# Patient Record
Sex: MALE | URBAN - METROPOLITAN AREA
[De-identification: names, ages, dates, MRNs, and addresses within clinical notes are randomized per-mention and may not be internally consistent; named-entity substitution may affect disease eponyms.]

---

## 2020-08-13 ENCOUNTER — APPOINTMENT (OUTPATIENT)
Age: 45
Setting detail: DERMATOLOGY
End: 2020-08-16

## 2020-08-13 DIAGNOSIS — L29.8 OTHER PRURITUS: ICD-10-CM

## 2020-08-13 DIAGNOSIS — L28.0 LICHEN SIMPLEX CHRONICUS: ICD-10-CM

## 2020-08-13 PROBLEM — L30.9 DERMATITIS, UNSPECIFIED: Status: ACTIVE | Noted: 2020-08-13

## 2020-08-13 PROCEDURE — OTHER MIPS QUALITY: OTHER

## 2020-08-13 PROCEDURE — OTHER PRESCRIPTION: OTHER

## 2020-08-13 PROCEDURE — 99202 OFFICE O/P NEW SF 15 MIN: CPT | Mod: 25

## 2020-08-13 PROCEDURE — 11105 PUNCH BX SKIN EA SEP/ADDL: CPT

## 2020-08-13 PROCEDURE — OTHER BIOPSY BY PUNCH METHOD: OTHER

## 2020-08-13 PROCEDURE — OTHER COUNSELING: OTHER

## 2020-08-13 PROCEDURE — 11104 PUNCH BX SKIN SINGLE LESION: CPT

## 2020-08-13 RX ORDER — HYDROXYZINE HYDROCHLORIDE 25 MG/1
TABLET, FILM COATED ORAL QD
Qty: 60 | Refills: 1 | Status: ERX | COMMUNITY
Start: 2020-08-13

## 2020-08-13 RX ORDER — FEXOFENADINE 180 MG/1
TABLET, FILM COATED ORAL QD
Qty: 30 | Refills: 1 | Status: ERX | COMMUNITY
Start: 2020-08-13

## 2020-08-13 RX ORDER — CLOBETASOL PROPIONATE 0.5 MG/G
OINTMENT TOPICAL BID
Qty: 1 | Refills: 1 | Status: ERX | COMMUNITY
Start: 2020-08-13

## 2020-08-13 ASSESSMENT — LOCATION DETAILED DESCRIPTION DERM
LOCATION DETAILED: RIGHT MEDIAL DISTAL PRETIBIAL REGION
LOCATION DETAILED: RIGHT DISTAL PRETIBIAL REGION
LOCATION DETAILED: RIGHT ANKLE

## 2020-08-13 ASSESSMENT — LOCATION ZONE DERM: LOCATION ZONE: LEG

## 2020-08-13 ASSESSMENT — LOCATION SIMPLE DESCRIPTION DERM
LOCATION SIMPLE: RIGHT PRETIBIAL REGION
LOCATION SIMPLE: RIGHT ANKLE

## 2020-08-13 NOTE — PROCEDURE: BIOPSY BY PUNCH METHOD
Was A Bandage Applied: Yes
Notification Instructions: Patient will be notified of biopsy results. However, patient instructed to call the office if not contacted within 2 weeks.
Punch Size In Mm: 3
Bill For Surgical Tray: no
Information: Selecting Yes will display possible errors in your note based on the variables you have selected. This validation is only offered as a suggestion for you. PLEASE NOTE THAT THE VALIDATION TEXT WILL BE REMOVED WHEN YOU FINALIZE YOUR NOTE. IF YOU WANT TO FAX A PRELIMINARY NOTE YOU WILL NEED TO TOGGLE THIS TO 'NO' IF YOU DO NOT WANT IT IN YOUR FAXED NOTE.
Billing Type: Third-Party Bill
Post-Care Instructions: I reviewed with the patient in detail post-care instructions. Patient is to keep the biopsy site dry overnight, and then wash with hydrogen peroxide 50/50 mix or antibacterial soap and water twice daily and apply bacitracin twice daily healed. Patient may apply hydrogen peroxide soaks to remove any crusting.
Biopsy Type: H and E
Anesthesia Type: 1% lidocaine without epinephrine
Wound Care: Bacitracin
Home Suture Removal Text: Patient was provided a home suture removal kit and will remove their sutures at home.  If they have any questions or difficulties they will call the office.
Suture Removal: 14 days
Epidermal Sutures: 4-0 Nylon
Detail Level: Detailed
Consent: Written consent was obtained and risks were reviewed including but not limited to scarring, infection, bleeding, scabbing, incomplete removal, nerve damage and allergy to anesthesia.
X Depth Of Punch In Cm (Optional): 0
Dressing: no dressing applied
Hemostasis: None
Anesthesia Volume In Cc (Will Not Render If 0): 0.5

## 2023-02-24 ENCOUNTER — TRANSCRIBE ORDERS (OUTPATIENT)
Dept: ADMINISTRATIVE | Facility: HOSPITAL | Age: 48
End: 2023-02-24
Payer: MEDICARE

## 2023-02-24 DIAGNOSIS — M54.50 LOW BACK PAIN, UNSPECIFIED BACK PAIN LATERALITY, UNSPECIFIED CHRONICITY, UNSPECIFIED WHETHER SCIATICA PRESENT: ICD-10-CM

## 2023-02-24 DIAGNOSIS — M54.31 RIGHT SIDED SCIATICA: Primary | ICD-10-CM

## 2023-03-18 ENCOUNTER — HOSPITAL ENCOUNTER (OUTPATIENT)
Dept: CT IMAGING | Facility: HOSPITAL | Age: 48
Discharge: HOME | End: 2023-03-18
Admitting: NURSE PRACTITIONER
Payer: MEDICARE

## 2023-03-18 DIAGNOSIS — M54.31 RIGHT SIDED SCIATICA: ICD-10-CM

## 2023-03-18 DIAGNOSIS — M54.50 LOW BACK PAIN, UNSPECIFIED BACK PAIN LATERALITY, UNSPECIFIED CHRONICITY, UNSPECIFIED WHETHER SCIATICA PRESENT: ICD-10-CM

## 2023-03-18 PROCEDURE — 25510000001 IOPAMIDOL 61 % SOLUTION: Performed by: NURSE PRACTITIONER

## 2023-03-18 PROCEDURE — 72133 CT LUMBAR SPINE W/O & W/DYE: CPT

## 2023-03-18 PROCEDURE — 72133 CT LUMBAR SPINE W/O & W/DYE: CPT | Performed by: RADIOLOGY

## 2023-03-18 RX ADMIN — IOPAMIDOL 84 ML: 612 INJECTION, SOLUTION INTRAVENOUS at 09:08

## 2023-12-26 ENCOUNTER — TRANSCRIBE ORDERS (OUTPATIENT)
Dept: ADMINISTRATIVE | Facility: HOSPITAL | Age: 48
End: 2023-12-26
Payer: MEDICARE

## 2023-12-26 DIAGNOSIS — R42 DIZZINESS AND GIDDINESS: Primary | ICD-10-CM

## 2024-01-18 ENCOUNTER — HOSPITAL ENCOUNTER (OUTPATIENT)
Dept: MRI IMAGING | Facility: HOSPITAL | Age: 49
Discharge: HOME | End: 2024-01-18
Payer: MEDICARE

## 2024-01-18 ENCOUNTER — HOSPITAL ENCOUNTER (OUTPATIENT)
Dept: CARDIOLOGY | Facility: HOSPITAL | Age: 49
Discharge: HOME | End: 2024-01-18
Payer: MEDICARE

## 2024-01-18 DIAGNOSIS — R42 DIZZINESS AND GIDDINESS: ICD-10-CM

## 2024-01-18 PROCEDURE — 93880 EXTRACRANIAL BILAT STUDY: CPT

## 2024-01-18 PROCEDURE — 70551 MRI BRAIN STEM W/O DYE: CPT | Performed by: RADIOLOGY

## 2024-01-18 PROCEDURE — 70551 MRI BRAIN STEM W/O DYE: CPT

## 2024-05-27 ENCOUNTER — HOSPITAL ENCOUNTER (EMERGENCY)
Facility: HOSPITAL | Age: 49
Discharge: HOME | End: 2024-05-27
Attending: STUDENT IN AN ORGANIZED HEALTH CARE EDUCATION/TRAINING PROGRAM | Admitting: STUDENT IN AN ORGANIZED HEALTH CARE EDUCATION/TRAINING PROGRAM
Payer: MEDICARE

## 2024-05-27 ENCOUNTER — APPOINTMENT (OUTPATIENT)
Dept: GENERAL RADIOLOGY | Facility: HOSPITAL | Age: 49
End: 2024-05-27
Payer: MEDICARE

## 2024-05-27 VITALS
OXYGEN SATURATION: 97 % | HEIGHT: 72 IN | BODY MASS INDEX: 29.12 KG/M2 | SYSTOLIC BLOOD PRESSURE: 138 MMHG | RESPIRATION RATE: 18 BRPM | WEIGHT: 215 LBS | DIASTOLIC BLOOD PRESSURE: 96 MMHG | HEART RATE: 62 BPM | TEMPERATURE: 98 F

## 2024-05-27 DIAGNOSIS — R07.9 CHEST PAIN, UNSPECIFIED TYPE: Primary | ICD-10-CM

## 2024-05-27 LAB
ALBUMIN SERPL-MCNC: 4.6 G/DL (ref 3.5–5.2)
ALBUMIN/GLOB SERPL: 1.6 G/DL
ALP SERPL-CCNC: 91 U/L (ref 39–117)
ALT SERPL W P-5'-P-CCNC: 15 U/L (ref 1–41)
ANION GAP SERPL CALCULATED.3IONS-SCNC: 10 MMOL/L (ref 5–15)
AST SERPL-CCNC: 20 U/L (ref 1–40)
BASOPHILS # BLD AUTO: 0.05 10*3/MM3 (ref 0–0.2)
BASOPHILS NFR BLD AUTO: 0.5 % (ref 0–1.5)
BILIRUB SERPL-MCNC: 0.5 MG/DL (ref 0–1.2)
BUN SERPL-MCNC: 6 MG/DL (ref 6–20)
BUN/CREAT SERPL: 5.5 (ref 7–25)
CALCIUM SPEC-SCNC: 9.3 MG/DL (ref 8.6–10.5)
CHLORIDE SERPL-SCNC: 105 MMOL/L (ref 98–107)
CO2 SERPL-SCNC: 24 MMOL/L (ref 22–29)
CREAT SERPL-MCNC: 1.1 MG/DL (ref 0.76–1.27)
DEPRECATED RDW RBC AUTO: 44.9 FL (ref 37–54)
EGFRCR SERPLBLD CKD-EPI 2021: 82.8 ML/MIN/1.73
EOSINOPHIL # BLD AUTO: 0.24 10*3/MM3 (ref 0–0.4)
EOSINOPHIL NFR BLD AUTO: 2.6 % (ref 0.3–6.2)
ERYTHROCYTE [DISTWIDTH] IN BLOOD BY AUTOMATED COUNT: 13.7 % (ref 12.3–15.4)
GEN 5 2HR TROPONIN T REFLEX: <6 NG/L
GLOBULIN UR ELPH-MCNC: 2.9 GM/DL
GLUCOSE SERPL-MCNC: 93 MG/DL (ref 65–99)
HCT VFR BLD AUTO: 46.2 % (ref 37.5–51)
HGB BLD-MCNC: 15.7 G/DL (ref 13–17.7)
HOLD SPECIMEN: NORMAL
HOLD SPECIMEN: NORMAL
IMM GRANULOCYTES # BLD AUTO: 0.03 10*3/MM3 (ref 0–0.05)
IMM GRANULOCYTES NFR BLD AUTO: 0.3 % (ref 0–0.5)
LYMPHOCYTES # BLD AUTO: 2.74 10*3/MM3 (ref 0.7–3.1)
LYMPHOCYTES NFR BLD AUTO: 29.8 % (ref 19.6–45.3)
MCH RBC QN AUTO: 30.6 PG (ref 26.6–33)
MCHC RBC AUTO-ENTMCNC: 34 G/DL (ref 31.5–35.7)
MCV RBC AUTO: 90.1 FL (ref 79–97)
MONOCYTES # BLD AUTO: 0.59 10*3/MM3 (ref 0.1–0.9)
MONOCYTES NFR BLD AUTO: 6.4 % (ref 5–12)
NEUTROPHILS NFR BLD AUTO: 5.55 10*3/MM3 (ref 1.7–7)
NEUTROPHILS NFR BLD AUTO: 60.4 % (ref 42.7–76)
NRBC BLD AUTO-RTO: 0 /100 WBC (ref 0–0.2)
PLATELET # BLD AUTO: 163 10*3/MM3 (ref 140–450)
PMV BLD AUTO: 10.4 FL (ref 6–12)
POTASSIUM SERPL-SCNC: 3.9 MMOL/L (ref 3.5–5.2)
PROT SERPL-MCNC: 7.5 G/DL (ref 6–8.5)
RBC # BLD AUTO: 5.13 10*6/MM3 (ref 4.14–5.8)
SODIUM SERPL-SCNC: 139 MMOL/L (ref 136–145)
TROPONIN T DELTA: NORMAL
TROPONIN T SERPL HS-MCNC: 6 NG/L
WBC NRBC COR # BLD AUTO: 9.2 10*3/MM3 (ref 3.4–10.8)
WHOLE BLOOD HOLD COAG: NORMAL
WHOLE BLOOD HOLD SPECIMEN: NORMAL

## 2024-05-27 PROCEDURE — 85025 COMPLETE CBC W/AUTO DIFF WBC: CPT | Performed by: STUDENT IN AN ORGANIZED HEALTH CARE EDUCATION/TRAINING PROGRAM

## 2024-05-27 PROCEDURE — 71045 X-RAY EXAM CHEST 1 VIEW: CPT

## 2024-05-27 PROCEDURE — 25010000002 KETOROLAC TROMETHAMINE PER 15 MG: Performed by: STUDENT IN AN ORGANIZED HEALTH CARE EDUCATION/TRAINING PROGRAM

## 2024-05-27 PROCEDURE — 96374 THER/PROPH/DIAG INJ IV PUSH: CPT

## 2024-05-27 PROCEDURE — 80053 COMPREHEN METABOLIC PANEL: CPT | Performed by: STUDENT IN AN ORGANIZED HEALTH CARE EDUCATION/TRAINING PROGRAM

## 2024-05-27 PROCEDURE — 93005 ELECTROCARDIOGRAM TRACING: CPT | Performed by: STUDENT IN AN ORGANIZED HEALTH CARE EDUCATION/TRAINING PROGRAM

## 2024-05-27 PROCEDURE — 84484 ASSAY OF TROPONIN QUANT: CPT | Performed by: STUDENT IN AN ORGANIZED HEALTH CARE EDUCATION/TRAINING PROGRAM

## 2024-05-27 PROCEDURE — 99284 EMERGENCY DEPT VISIT MOD MDM: CPT

## 2024-05-27 PROCEDURE — 36415 COLL VENOUS BLD VENIPUNCTURE: CPT

## 2024-05-27 PROCEDURE — 71045 X-RAY EXAM CHEST 1 VIEW: CPT | Performed by: RADIOLOGY

## 2024-05-27 RX ORDER — ASPIRIN 81 MG/1
324 TABLET, CHEWABLE ORAL ONCE
Status: COMPLETED | OUTPATIENT
Start: 2024-05-27 | End: 2024-05-27

## 2024-05-27 RX ORDER — SODIUM CHLORIDE 0.9 % (FLUSH) 0.9 %
10 SYRINGE (ML) INJECTION AS NEEDED
Status: DISCONTINUED | OUTPATIENT
Start: 2024-05-27 | End: 2024-05-28 | Stop reason: HOSPADM

## 2024-05-27 RX ORDER — CYCLOBENZAPRINE HCL 10 MG
10 TABLET ORAL ONCE
Status: COMPLETED | OUTPATIENT
Start: 2024-05-27 | End: 2024-05-27

## 2024-05-27 RX ORDER — KETOROLAC TROMETHAMINE 30 MG/ML
30 INJECTION, SOLUTION INTRAMUSCULAR; INTRAVENOUS ONCE
Status: COMPLETED | OUTPATIENT
Start: 2024-05-27 | End: 2024-05-27

## 2024-05-27 RX ADMIN — ASPIRIN 324 MG: 81 TABLET, CHEWABLE ORAL at 20:08

## 2024-05-27 RX ADMIN — KETOROLAC TROMETHAMINE 30 MG: 30 INJECTION, SOLUTION INTRAMUSCULAR; INTRAVENOUS at 23:18

## 2024-05-27 RX ADMIN — CYCLOBENZAPRINE 10 MG: 10 TABLET, FILM COATED ORAL at 23:18

## 2024-05-28 LAB
QT INTERVAL: 348 MS
QTC INTERVAL: 408 MS

## 2024-05-28 NOTE — ED PROVIDER NOTES
Subjective   History of Present Illness  49-year-old male with past medical history of anxiety and smoking presents to the ER with a primary complaint of right-sided chest pain this been present for the last 1 to 2 weeks.  Patient notes intermittent chest pain.  No obvious aggravating factors.  Patient notes the chest tightness with radiation into his right arm.  No shortness of breath.  No nausea or vomiting.  No diaphoresis.  No obvious alleviating factors.  Vital signs stable.  Afebrile      Review of Systems   Cardiovascular:  Positive for chest pain.   All other systems reviewed and are negative.      No past medical history on file.    No Known Allergies    No past surgical history on file.    No family history on file.    Social History     Socioeconomic History    Marital status:            Objective   Physical Exam  Constitutional:       General: He is not in acute distress.     Appearance: He is well-developed. He is not ill-appearing.   HENT:      Head: Normocephalic and atraumatic.   Eyes:      Extraocular Movements: Extraocular movements intact.      Pupils: Pupils are equal, round, and reactive to light.   Neck:      Vascular: No JVD.   Cardiovascular:      Rate and Rhythm: Normal rate and regular rhythm.      Heart sounds: Normal heart sounds. No murmur heard.  Pulmonary:      Effort: No tachypnea, accessory muscle usage or respiratory distress.      Breath sounds: Normal breath sounds. No stridor. No decreased breath sounds, wheezing, rhonchi or rales.   Chest:      Chest wall: No deformity, tenderness or crepitus.   Abdominal:      General: Bowel sounds are normal.      Palpations: Abdomen is soft.      Tenderness: There is no abdominal tenderness. There is no guarding or rebound.   Musculoskeletal:         General: Normal range of motion.      Cervical back: Normal range of motion and neck supple.      Right lower leg: No tenderness. No edema.      Left lower leg: No tenderness. No edema.    Lymphadenopathy:      Cervical: No cervical adenopathy.   Skin:     General: Skin is warm and dry.      Coloration: Skin is not cyanotic.      Findings: No ecchymosis or erythema.   Neurological:      General: No focal deficit present.      Mental Status: He is alert and oriented to person, place, and time.      Cranial Nerves: No cranial nerve deficit.      Motor: No weakness.   Psychiatric:         Mood and Affect: Mood normal. Mood is not anxious.         Behavior: Behavior normal. Behavior is not agitated.         Procedures           ED Course  ED Course as of 05/27/24 2326   Mon May 27, 2024   1957 EKG notes sinus rhythm.  83 bpm.  I directly evaluated the EKG of this patient and noted no acute abnormalities..  Electronically signed by Jason Conde DO, 05/27/24, 7:58 PM EDT.   [SF]      ED Course User Index  [SF] Jason Conde DO                HEART Score: 2                  Results for orders placed or performed during the hospital encounter of 05/27/24   High Sensitivity Troponin T    Specimen: Arm, Left; Blood   Result Value Ref Range    HS Troponin T 6 <22 ng/L   Comprehensive Metabolic Panel    Specimen: Arm, Left; Blood   Result Value Ref Range    Glucose 93 65 - 99 mg/dL    BUN 6 6 - 20 mg/dL    Creatinine 1.10 0.76 - 1.27 mg/dL    Sodium 139 136 - 145 mmol/L    Potassium 3.9 3.5 - 5.2 mmol/L    Chloride 105 98 - 107 mmol/L    CO2 24.0 22.0 - 29.0 mmol/L    Calcium 9.3 8.6 - 10.5 mg/dL    Total Protein 7.5 6.0 - 8.5 g/dL    Albumin 4.6 3.5 - 5.2 g/dL    ALT (SGPT) 15 1 - 41 U/L    AST (SGOT) 20 1 - 40 U/L    Alkaline Phosphatase 91 39 - 117 U/L    Total Bilirubin 0.5 0.0 - 1.2 mg/dL    Globulin 2.9 gm/dL    A/G Ratio 1.6 g/dL    BUN/Creatinine Ratio 5.5 (L) 7.0 - 25.0    Anion Gap 10.0 5.0 - 15.0 mmol/L    eGFR 82.8 >60.0 mL/min/1.73   CBC Auto Differential    Specimen: Arm, Left; Blood   Result Value Ref Range    WBC 9.20 3.40 - 10.80 10*3/mm3    RBC 5.13 4.14 - 5.80 10*6/mm3    Hemoglobin  15.7 13.0 - 17.7 g/dL    Hematocrit 46.2 37.5 - 51.0 %    MCV 90.1 79.0 - 97.0 fL    MCH 30.6 26.6 - 33.0 pg    MCHC 34.0 31.5 - 35.7 g/dL    RDW 13.7 12.3 - 15.4 %    RDW-SD 44.9 37.0 - 54.0 fl    MPV 10.4 6.0 - 12.0 fL    Platelets 163 140 - 450 10*3/mm3    Neutrophil % 60.4 42.7 - 76.0 %    Lymphocyte % 29.8 19.6 - 45.3 %    Monocyte % 6.4 5.0 - 12.0 %    Eosinophil % 2.6 0.3 - 6.2 %    Basophil % 0.5 0.0 - 1.5 %    Immature Grans % 0.3 0.0 - 0.5 %    Neutrophils, Absolute 5.55 1.70 - 7.00 10*3/mm3    Lymphocytes, Absolute 2.74 0.70 - 3.10 10*3/mm3    Monocytes, Absolute 0.59 0.10 - 0.90 10*3/mm3    Eosinophils, Absolute 0.24 0.00 - 0.40 10*3/mm3    Basophils, Absolute 0.05 0.00 - 0.20 10*3/mm3    Immature Grans, Absolute 0.03 0.00 - 0.05 10*3/mm3    nRBC 0.0 0.0 - 0.2 /100 WBC   High Sensitivity Troponin T 2Hr    Specimen: Arm, Left; Blood   Result Value Ref Range    HS Troponin T <6 <22 ng/L    Troponin T Delta     ECG 12 Lead Chest Pain   Result Value Ref Range    QT Interval 348 ms    QTC Interval 408 ms   Green Top (Gel)   Result Value Ref Range    Extra Tube Hold for add-ons.    Lavender Top   Result Value Ref Range    Extra Tube hold for add-on    Gold Top - SST   Result Value Ref Range    Extra Tube Hold for add-ons.    Light Blue Top   Result Value Ref Range    Extra Tube Hold for add-ons.          XR Chest 1 View    Result Date: 5/27/2024  No acute cardiopulmonary process.   This report was finalized on 5/27/2024 8:08 PM by Alex Pallas, DO.                 Medical Decision Making  CBC and CMP are unremarkable.  Troponin trended x 2 and within normal is.  EKG listed.  Chest x-ray unremarkable.  Patient will likely need further outpatient follow-up and workup.  Low clinical concerns for ACS at this point per high-sensitivity troponin protocol at [facility].  Work up and results were discussed throughly with the patient.  The patient will be discharged for further monitoring and management with  their PCP.  Red flags, warning signs, worsening symptoms, and when to return to the ER discussed with and understood by the patient.  Patient will follow up with their PCP in a timely manner.  Vitals stable at discharge.    Amount and/or Complexity of Data Reviewed  Labs: ordered. Decision-making details documented in ED Course.  Radiology: ordered. Decision-making details documented in ED Course.  ECG/medicine tests: ordered.    Risk  OTC drugs.  Prescription drug management.        Final diagnoses:   Chest pain, unspecified type       ED Disposition  ED Disposition       ED Disposition   Discharge    Condition   Stable    Comment   --               Daniela Delaney, APRN  [address] [address]  [phone]    In 1 week      [facility] EMERGENCY DEPARTMENT  [address] [place] [id number] [phone]    If symptoms worsen         Medication List      No changes were made to your prescriptions during this visit.            Jason Conde DO  05/27/24 [id number]

## 2024-06-11 ENCOUNTER — PATIENT ROUNDING (BHMG ONLY) (OUTPATIENT)
Dept: CARDIOLOGY | Facility: CLINIC | Age: 49
End: 2024-06-11
Payer: MEDICARE

## 2024-06-11 ENCOUNTER — OFFICE VISIT (OUTPATIENT)
Dept: CARDIOLOGY | Facility: CLINIC | Age: 49
End: 2024-06-11
Payer: MEDICARE

## 2024-06-11 VITALS
HEART RATE: 72 BPM | SYSTOLIC BLOOD PRESSURE: 147 MMHG | WEIGHT: 215.8 LBS | HEIGHT: 72 IN | RESPIRATION RATE: 18 BRPM | BODY MASS INDEX: 29.23 KG/M2 | OXYGEN SATURATION: 98 % | DIASTOLIC BLOOD PRESSURE: 89 MMHG

## 2024-06-11 DIAGNOSIS — R07.2 PRECORDIAL CHEST PAIN: Primary | ICD-10-CM

## 2024-06-11 DIAGNOSIS — E78.5 DYSLIPIDEMIA: ICD-10-CM

## 2024-06-11 DIAGNOSIS — F17.210 CIGARETTE SMOKER: ICD-10-CM

## 2024-06-11 DIAGNOSIS — I10 ESSENTIAL HYPERTENSION: ICD-10-CM

## 2024-06-11 PROCEDURE — 99204 OFFICE O/P NEW MOD 45 MIN: CPT | Performed by: SPECIALIST

## 2024-06-11 PROCEDURE — 3079F DIAST BP 80-89 MM HG: CPT | Performed by: SPECIALIST

## 2024-06-11 PROCEDURE — 3077F SYST BP >= 140 MM HG: CPT | Performed by: SPECIALIST

## 2024-06-11 RX ORDER — NITROGLYCERIN 0.4 MG/1
TABLET SUBLINGUAL
Qty: 25 TABLET | Refills: 3 | Status: SHIPPED | OUTPATIENT
Start: 2024-06-11

## 2024-06-11 RX ORDER — CLONAZEPAM 1 MG/1
TABLET ORAL
COMMUNITY

## 2024-06-11 RX ORDER — DULOXETIN HYDROCHLORIDE 60 MG/1
1 CAPSULE, DELAYED RELEASE ORAL DAILY
COMMUNITY
Start: 2024-05-30

## 2024-06-11 NOTE — PROGRESS NOTES
Subjective   Initial consultation, chest pain  Alejo Wheatley is a 49 y.o. male who presents to day for Establish Care.    CHIEF COMPLIANT  Chief Complaint   Patient presents with    Establish Care       Active Problems:  Problem List Items Addressed This Visit          Cardiac and Vasculature    Precordial chest pain - Primary    Relevant Orders    Stress Test With Myocardial Perfusion One Day    Adult Transthoracic Echo Complete w/ Color, Spectral and Contrast if necessary per protocol    Essential hypertension    Relevant Orders    Stress Test With Myocardial Perfusion One Day    Adult Transthoracic Echo Complete w/ Color, Spectral and Contrast if necessary per protocol    Dyslipidemia    Relevant Orders    Lipid Panel       Tobacco    Cigarette smoker       HPI  HPI  For the last couple of weeks the patient has been having almost daily chest pains they can last for hours they are retrosternal not radiating anywhere and nothing increases or decreases the pain not as she has any other symptoms and is not anxious to exertion or food have been happening now almost daily as mentioned above it can last anytime after 2 hours he was seen in the emergency room with negative troponin he said he is fairly active with no shortness of breath no edema no palpitations he smokes about a pack per day for almost 30 years and no history of hypertension or diabetes but has history of hyperlipidemia not taking medicine for that, family history his father has 60 he is but paternal grandmother also  of a heart attack in her 70s  PRIOR MEDS  Current Outpatient Medications on File Prior to Visit   Medication Sig Dispense Refill    DULoxetine (CYMBALTA) 60 MG capsule Take 1 capsule by mouth Daily.      KlonoPIN 1 MG tablet        No current facility-administered medications on file prior to visit.       ALLERGIES  Patient has no known allergies.    HISTORY  Past Medical History:   Diagnosis Date    Hyperlipidemia     Hypertension      "Panic attack        Social History     Socioeconomic History    Marital status:    Tobacco Use    Smoking status: Every Day     Current packs/day: 1.50     Average packs/day: 1.5 packs/day for 15.0 years (22.5 ttl pk-yrs)     Types: Cigarettes   Vaping Use    Vaping status: Never Used   Substance and Sexual Activity    Alcohol use: Never    Drug use: Never    Sexual activity: Yes     Partners: Female     Birth control/protection: None       History reviewed. No pertinent family history.    Review of Systems   Constitutional: Negative.    HENT: Negative.     Eyes: Negative.    Respiratory:  Positive for chest tightness (Not active). Negative for apnea, cough, choking, shortness of breath, wheezing and stridor.    Cardiovascular: Negative.  Negative for chest pain, palpitations and leg swelling.   Gastrointestinal: Negative.    Endocrine: Negative.    Genitourinary: Negative.    Musculoskeletal: Negative.    Skin: Negative.    Allergic/Immunologic: Negative.    Neurological: Negative.    Hematological: Negative.    Psychiatric/Behavioral: Negative.         Objective     VITALS: /89 (BP Location: Left arm, Patient Position: Sitting, Cuff Size: Adult)   Pulse 72   Resp 18   Ht 182.9 cm (72\")   Wt 97.9 kg (215 lb 12.8 oz)   SpO2 98%   BMI 29.27 kg/m²     LABS:   Lab Results (most recent)       None            IMAGING:   XR Chest 1 View    Result Date: 5/27/2024  No acute cardiopulmonary process.   This report was finalized on 5/27/2024 8:08 PM by Alex Pallas, DO.        EXAM:  Physical Exam  Constitutional:       Appearance: He is well-developed.   HENT:      Head: Normocephalic and atraumatic.   Eyes:      Pupils: Pupils are equal, round, and reactive to light.   Neck:      Thyroid: No thyromegaly.      Vascular: No JVD.   Cardiovascular:      Rate and Rhythm: Normal rate and regular rhythm.      Chest Wall: PMI is not displaced.      Pulses: Normal pulses.      Heart sounds: Normal heart sounds, S1 " normal and S2 normal. No murmur heard.     No friction rub. No gallop.   Pulmonary:      Effort: Pulmonary effort is normal. No respiratory distress.      Breath sounds: Normal breath sounds. No stridor. No wheezing or rales.   Chest:      Chest wall: No tenderness.   Abdominal:      General: Bowel sounds are normal. There is no distension.      Palpations: Abdomen is soft. There is no mass.      Tenderness: There is no abdominal tenderness. There is no guarding or rebound.   Musculoskeletal:      Cervical back: Neck supple. No edema.   Skin:     General: Skin is warm and dry.      Coloration: Skin is not pale.      Findings: No erythema or rash.   Neurological:      Mental Status: He is alert and oriented to person, place, and time.      Cranial Nerves: No cranial nerve deficit.      Coordination: Coordination normal.   Psychiatric:         Behavior: Behavior normal.         Procedure   Procedures        Assessment & Plan     Diagnoses and all orders for this visit:    1. Precordial chest pain (Primary)  -     Stress Test With Myocardial Perfusion One Day; Future  -     Adult Transthoracic Echo Complete w/ Color, Spectral and Contrast if necessary per protocol; Future    2. Essential hypertension  -     Stress Test With Myocardial Perfusion One Day; Future  -     Adult Transthoracic Echo Complete w/ Color, Spectral and Contrast if necessary per protocol; Future    3. Dyslipidemia  -     Lipid Panel; Future    4. Cigarette smoker    Other orders  -     nitroglycerin (NITROSTAT) 0.4 MG SL tablet; 1 under the tongue as needed for angina, may repeat q5mins for up three doses  Dispense: 25 tablet; Refill: 3    1.  Patient is having chest pain with typical and atypical features were seen in the emergency room with negative troponin, he does have risk factors including family history of hyperlipidemia and tobacco abuse and possible hypertension somewhat to proceed with stress testing for assessment of ischemia will also  get an echocardiac to assess cardiac function wall motion and valve morphology, take aspirin 81 mg/day  2.  His blood pressure is elevated but he states that his blood pressure at home is well-controlled so we will monitor for now  3.  He was told that his cholesterol is high including closer up to 400 [not clear which could lipid part which is so elevated] he is not taking any lipid medication except for fish oil he says that his lipid profile  4.  I strongly advised him to quit smoking he said he did not need assistance for quitting smoking    Return After stress test.                 MEDS ORDERED DURING VISIT:  New Medications Ordered This Visit   Medications    nitroglycerin (NITROSTAT) 0.4 MG SL tablet     Si under the tongue as needed for angina, may repeat q5mins for up three doses     Dispense:  25 tablet     Refill:  3       As always, Daniela Delaney, JUAN  I appreciate very much the opportunity to participate in the cardiovascular care of your patients. Please do not hesitate to call me with any questions with regards to Alejo Wheatley evaluation and management.         This document has been electronically signed by Charlee Diaz MD  2024 10:43 EDT    This note is dictated utilizing voice recognition software.

## 2024-06-11 NOTE — PROGRESS NOTES
A [employer] message has been sent to the patient for patient rounding for [facility].

## 2024-06-25 ENCOUNTER — OFFICE VISIT (OUTPATIENT)
Dept: PSYCHIATRY | Facility: CLINIC | Age: 49
End: 2024-06-25
Payer: MEDICARE

## 2024-06-25 VITALS
TEMPERATURE: 97.6 F | BODY MASS INDEX: 28.79 KG/M2 | HEIGHT: 72 IN | OXYGEN SATURATION: 99 % | WEIGHT: 212.6 LBS | SYSTOLIC BLOOD PRESSURE: 156 MMHG | HEART RATE: 69 BPM | DIASTOLIC BLOOD PRESSURE: 98 MMHG

## 2024-06-25 DIAGNOSIS — F40.01 PANIC DISORDER WITH AGORAPHOBIA: Primary | ICD-10-CM

## 2024-06-25 PROCEDURE — 90792 PSYCH DIAG EVAL W/MED SRVCS: CPT

## 2024-06-25 PROCEDURE — 3077F SYST BP >= 140 MM HG: CPT

## 2024-06-25 PROCEDURE — 1160F RVW MEDS BY RX/DR IN RCRD: CPT

## 2024-06-25 PROCEDURE — 3080F DIAST BP >= 90 MM HG: CPT

## 2024-06-25 PROCEDURE — 1159F MED LIST DOCD IN RCRD: CPT

## 2024-06-25 RX ORDER — CLONAZEPAM 1 MG/1
TABLET ORAL
Qty: 37 TABLET | Refills: 0 | Status: SHIPPED | OUTPATIENT
Start: 2024-06-25 | End: 2024-07-01 | Stop reason: SDUPTHER

## 2024-06-25 RX ORDER — DULOXETIN HYDROCHLORIDE 30 MG/1
30 CAPSULE, DELAYED RELEASE ORAL DAILY
COMMUNITY
End: 2024-06-27

## 2024-06-25 NOTE — PROGRESS NOTES
"Subjective   Alejo Wheatley is a 49 y.o. male who is here today for initial appointment to evaluate for medication options.  Referral from Shaniqua Pack PA-C.    Chief Complaint: Anxiety and panic    HPI:  History of Present Illness    Patient reports that he has struggled since his early 20s with intermittent episodes of panic.  He reports that he feels he is \"always in a fear of it happening and planning my day around what to do if it does happen.\"  He reports that he is often referencing what if scenarios and catastrophic outcomes.  He reports the panic attacks occur 3-4 times weekly.  Often provoked by getting out of the house.  Reports that he often becomes overwhelmed with the feeling of being scared.\"  Often will feel hot, my arms will feel funny, feel like I have to go to the bathroom, then I feel like I am dying, I always have to make sure there is a hospital close by.\"  He reports that he has been evaluated by EMS services multiple times associated with panic attacks.  He reports that \"this do not make sense but I am also afraid of medicine is hard for me to take medicine.\"  He reports that he had a \"real bad spell\" a week ago which clonazepam was increased to 1 mg daily by PCP.  He reports that clonazepam helps reduce panic once it is started but does not eliminate it or prevent it.  He reports that he can often call his aunt who also struggles with panic attacks and this is helpful to him at times.  Reports that he does not leave his house much by himself.  He works from home to avoid having to leave.  Often is with his wife who he feels comfortable with.  Reports that when he travels he often has to take multiple stops to get out and walk around and to distract his self to prevent panic.  He reports that it takes a lot of self encouragement to leave the home.  He reports that when he does have panic he feels that he has to go back home because that is where he feels safe.  He reports that he feels that " "anxiety is hindering to day-to-day function.  He reports that \"I hate to admit it but if someone asked me to do something, I know I am going to have a panic attack, so I will make of an excuse to not go somewhere.\"  Denies any overt feelings of persistent depression but often finds himself experiencing dysphoria associated with \"not being able to get out of the house and do things with my wife I know that she wants to do.\"  Sleep is referenced as being \"pretty good.\"  6+ hours nightly.  Denies any nightmares.  No appetite concerns.  2 meals daily and snacks.  Denies any restricting, binging, or purging in regards to body image concerns. Body mass index is 28.83 kg/m².  He denies any obsessive or compulsive tendencies.  Denies any prolonged episodes of mood elevation that would be suggestive of mayra and/or hypomania.  He reports that he has had 2 previous marriages both ending related to infidelity.  Current marriage is identified as healthy.  Denies any history of physical, emotional, verbal, sexual abuse and neglect.  He denies self-harm behaviors.  He denies AVH.  He denies SI and HI.    Past Psych History: Patient has been previously diagnosed and treated for anxiety and panic by PCP.  Denies any outpatient or inpatient psychiatric providers.  Denies a history of TBI or seizures.  Denies any knowledge of exposure to illicit substance or toxins while in utero.    Previous Psych Meds: Patient reports that he has previously trialed and failed multiple medications but is unsure of what these are.  Currently on Cymbalta and clonazepam.  Reports recent dose increase in Cymbalta however recent reduction associated with increased blood pressure and worsening of panic.    Substance Abuse: Patient denies any history of illicit substance use, EtOH.  Does take clonazepam, prescribed.  Caffeine intake.    Social History: Patient reports that he was born and raised locally to biological mom and dad.  1 brother.  10th grade " education.  Self-employed rebuilding cars.  3 marriages.  2 marriages ended related to infidelity.  Current marriage x 7 years, healthy and supportive.  3 biological daughters.  3 stepdaughters.  1 grandson and 1 granddaughter.  Denies any previous incarcerations or pending litigation.  Denies any  service.      Family Psychiatric History:  family history includes Anxiety disorder in his father and paternal aunt.    Medical/Surgical History:  Past Medical History:   Diagnosis Date    Hyperlipidemia     Hypertension     Panic attack     Panic disorder      History reviewed. No pertinent surgical history.    No Known Allergies        Current Medications:   Current Outpatient Medications   Medication Sig Dispense Refill    DULoxetine (CYMBALTA) 30 MG capsule Take 1 capsule by mouth Daily.      KlonoPIN 1 MG tablet 1 mg every morning for panic and may take 0.5 mg every evening as needed for panic 37 tablet 0    nitroglycerin (NITROSTAT) 0.4 MG SL tablet 1 under the tongue as needed for angina, may repeat q5mins for up three doses 25 tablet 3     No current facility-administered medications for this visit.         Review of Systems   Constitutional: Negative.    HENT: Negative.     Eyes: Negative.    Respiratory: Negative.     Cardiovascular: Negative.    Gastrointestinal: Negative.    Endocrine: Negative.    Genitourinary: Negative.    Musculoskeletal: Negative.    Skin: Negative.    Allergic/Immunologic: Negative.    Neurological: Negative.    Hematological: Negative.    Psychiatric/Behavioral:  The patient is nervous/anxious.     denies HEENT, cardiovascular, respiratory, liver, renal, GI/, endocrine, neuro, DERM, hematology, immunology, musculoskeletal disorders.    Objective   Physical Exam  Vitals reviewed.   Constitutional:       Appearance: Normal appearance.   HENT:      Head: Normocephalic.      Nose: Nose normal.      Mouth/Throat:      Mouth: Mucous membranes are moist.      Pharynx: Oropharynx is  "clear.   Eyes:      Extraocular Movements: Extraocular movements intact.      Pupils: Pupils are equal, round, and reactive to light.   Cardiovascular:      Rate and Rhythm: Normal rate.   Pulmonary:      Effort: Pulmonary effort is normal.   Musculoskeletal:         General: Normal range of motion.      Cervical back: Normal range of motion.   Skin:     General: Skin is warm and dry.   Neurological:      General: No focal deficit present.      Mental Status: He is alert and oriented to person, place, and time.   Psychiatric:         Attention and Perception: Attention and perception normal. He is attentive.         Mood and Affect: Mood is anxious. Affect is tearful.         Speech: Speech normal.         Behavior: Behavior normal. Behavior is cooperative.         Thought Content: Thought content normal.         Cognition and Memory: Cognition and memory normal.         Judgment: Judgment normal. Judgment is not impulsive.     Blood pressure 156/98, pulse 69, temperature 97.6 °F (36.4 °C), height 182.9 cm (72.01\"), weight 96.4 kg (212 lb 9.6 oz), SpO2 99%.    Mental Status Exam:   Hygiene:   good  Cooperation:  Cooperative  Eye Contact:  Good  Psychomotor Behavior:  Appropriate  Affect:   tearful  Hopelessness: Denies  Speech:  Normal  Thought Process:  Goal directed and Linear  Thought Content:  Normal  Suicidal:  None  Homicidal:  None  Hallucinations:  None  Delusion:  None  Memory:  Intact  Orientation:  Person, Place, Time, and Situation  Reliability:  fair  Insight:  Fair  Judgement:  Fair  Impulse Control:  Fair  Physical/Medical Issues:  No       Short-term goals: Patient will be compliant with clinic appointments.  Patient will be engaged in therapy, medication compliant with minimal side effects. Patient  will report decrease of symptoms and frequency.    Long-term goals: Patient will have minimal symptoms of  with continued medication management. Patient will be compliant with treatment and appointments. "      Strengths: motivation, insight, support   Weaknesses: coping skills    Prognosis: Guarded dependent on medication/follow up and treatment plan compliance.  Functional Status:severe impairment in areas of daily functioning.      Assessment & Plan   Diagnoses and all orders for this visit:    1. Panic disorder with agoraphobia (Primary)  -     KlonoPIN 1 MG tablet; 1 mg every morning for panic and may take 0.5 mg every evening as needed for panic  Dispense: 37 tablet; Refill: 0      -UDS collected and pending   -Continue cymbalta for now, until records received  -Increase clonazepam to 1 mg PO qAM and 0.5 mg daily PRN for panic   -DORA signed for records and verbal communication between PCP and myself   -Recommend psychotherapy   -Medication submitted to pharmacy at this time     Discussed medication and psychotherapy options.    Patient is unsure of past medications.  Plan will be to obtain before transitioning from Cymbalta to another agent.  Increasing clonazepam short-term until anxiety is controlled with SSRI/SNRI.  Discussed the risks, benefits, and side effects of the medication; client acknowledged and verbally consented.  Patient is aware to contact the [facility] with any worsening of symptom.  Patient is agreeable to go to the ER or call 911 should they begin SI/HI.    The patient is on a long-term benzodiazepine therapy which is needed for stable, consistent, and improved quality of life.  We have discussed potential risks and side effects including early onset dementia, addiction with continued use, sedation, fatigue, depression, dizziness, ataxia, slurred speech, weakness, forgetfulness, confusion, hyperexcitability, nervousness, hallucinations, mayra or hypomania, hypotension, hypersalivation, dry mouth, respiratory depression especially when taken with CNS depressants and in overdose, rare hepatic dysfunction, rare renal dysfunction, blood dyscrasias, increased risk of falls, and impaired  driving.  The patient is advised to not drive when taking a controlled substance.  The patient is also informed that the medication is to be used by the patient only, it is to be taken only as prescribed/directed, and to avoid any combined use of alcohol/ETOH or other substances unless prescribed and as directed by a Provider.  The patient verbalizes understanding and willingness in their own words to accept the potential side effects and risks for a better quality of life, and is currently in compliance and agreement with the plan of care.  A controlled substance agreement is on file, and the patient is in agreement with the terms of the controlled substance agreement.  The patient is advised that CATHY reports will be reviewed periodically, as well as random drug screens will be performed periodically, and as needed.      Return in about 4 weeks (around 7/23/2024), or if symptoms worsen or fail to improve, for Next scheduled follow up.        This document has been electronically signed by JUAN Mayfield   June 26, 2024 12:31 EDT     Errors in dictation may reflect use of voice recognition software and not all errors in transcription may have been detected prior to signing.

## 2024-06-27 ENCOUNTER — HOSPITAL ENCOUNTER (OUTPATIENT)
Dept: NUCLEAR MEDICINE | Facility: HOSPITAL | Age: 49
Discharge: HOME | End: 2024-06-27
Payer: MEDICARE

## 2024-06-27 ENCOUNTER — TELEPHONE (OUTPATIENT)
Dept: FAMILY MEDICINE CLINIC | Facility: CLINIC | Age: 49
End: 2024-06-27
Payer: MEDICARE

## 2024-06-27 ENCOUNTER — HOSPITAL ENCOUNTER (OUTPATIENT)
Dept: CARDIOLOGY | Facility: HOSPITAL | Age: 49
Discharge: HOME | End: 2024-06-27
Payer: MEDICARE

## 2024-06-27 ENCOUNTER — LAB (OUTPATIENT)
Dept: LAB | Facility: HOSPITAL | Age: 49
End: 2024-06-27
Payer: MEDICARE

## 2024-06-27 DIAGNOSIS — E78.5 DYSLIPIDEMIA: ICD-10-CM

## 2024-06-27 DIAGNOSIS — I10 ESSENTIAL HYPERTENSION: ICD-10-CM

## 2024-06-27 DIAGNOSIS — R07.2 PRECORDIAL CHEST PAIN: ICD-10-CM

## 2024-06-27 DIAGNOSIS — F40.01 PANIC DISORDER WITH AGORAPHOBIA: Primary | ICD-10-CM

## 2024-06-27 LAB
BH CV ECHO MEAS - ACS: 1.9 CM
BH CV ECHO MEAS - AO MAX PG: 7.4 MMHG
BH CV ECHO MEAS - AO MEAN PG: 4 MMHG
BH CV ECHO MEAS - AO ROOT DIAM: 3.4 CM
BH CV ECHO MEAS - AO V2 MAX: 136 CM/SEC
BH CV ECHO MEAS - AO V2 VTI: 30 CM
BH CV ECHO MEAS - EDV(CUBED): 165 ML
BH CV ECHO MEAS - EDV(MOD-SP4): 92 ML
BH CV ECHO MEAS - EF(MOD-SP4): 72 %
BH CV ECHO MEAS - ESV(CUBED): 41.6 ML
BH CV ECHO MEAS - ESV(MOD-SP4): 25.8 ML
BH CV ECHO MEAS - FS: 36.8 %
BH CV ECHO MEAS - IVS/LVPW: 0.99 CM
BH CV ECHO MEAS - IVSD: 1.01 CM
BH CV ECHO MEAS - LA DIMENSION: 3 CM
BH CV ECHO MEAS - LAT PEAK E' VEL: 10.6 CM/SEC
BH CV ECHO MEAS - LV DIASTOLIC VOL/BSA (35-75): 42.1 CM2
BH CV ECHO MEAS - LV MASS(C)D: 215.3 GRAMS
BH CV ECHO MEAS - LV SYSTOLIC VOL/BSA (12-30): 11.8 CM2
BH CV ECHO MEAS - LVIDD: 5.5 CM
BH CV ECHO MEAS - LVIDS: 3.5 CM
BH CV ECHO MEAS - LVOT AREA: 4.5 CM2
BH CV ECHO MEAS - LVOT DIAM: 2.4 CM
BH CV ECHO MEAS - LVPWD: 1.02 CM
BH CV ECHO MEAS - MED PEAK E' VEL: 7.8 CM/SEC
BH CV ECHO MEAS - MV A MAX VEL: 54.8 CM/SEC
BH CV ECHO MEAS - MV E MAX VEL: 75 CM/SEC
BH CV ECHO MEAS - MV E/A: 1.37
BH CV ECHO MEAS - PA ACC TIME: 0.12 SEC
BH CV ECHO MEAS - SV(MOD-SP4): 66.2 ML
BH CV ECHO MEAS - SVI(MOD-SP4): 30.3 ML/M2
BH CV ECHO MEAS - TAPSE (>1.6): 2.8 CM
BH CV ECHO MEASUREMENTS AVERAGE E/E' RATIO: 8.15
BH CV NUCLEAR PRIOR STUDY: 3
BH CV REST NUCLEAR ISOTOPE DOSE: 10.2 MCI
BH CV STRESS BP STAGE 1: NORMAL
BH CV STRESS BP STAGE 2: NORMAL
BH CV STRESS BP STAGE 3: NORMAL
BH CV STRESS DURATION MIN STAGE 1: 3
BH CV STRESS DURATION MIN STAGE 2: 3
BH CV STRESS DURATION MIN STAGE 3: 3
BH CV STRESS DURATION MIN STAGE 4: 2
BH CV STRESS DURATION SEC STAGE 1: 0
BH CV STRESS DURATION SEC STAGE 2: 0
BH CV STRESS DURATION SEC STAGE 3: 0
BH CV STRESS DURATION SEC STAGE 4: 2
BH CV STRESS GRADE STAGE 1: 10
BH CV STRESS GRADE STAGE 2: 12
BH CV STRESS GRADE STAGE 3: 14
BH CV STRESS GRADE STAGE 4: 16
BH CV STRESS HR STAGE 1: 95
BH CV STRESS HR STAGE 2: 108
BH CV STRESS HR STAGE 3: 126
BH CV STRESS HR STAGE 4: 146
BH CV STRESS METS STAGE 1: 5
BH CV STRESS METS STAGE 2: 7.5
BH CV STRESS METS STAGE 3: 10
BH CV STRESS METS STAGE 4: 13.5
BH CV STRESS NUCLEAR ISOTOPE DOSE: 29.7 MCI
BH CV STRESS PROTOCOL 1: NORMAL
BH CV STRESS RECOVERY BP: NORMAL MMHG
BH CV STRESS RECOVERY HR: 88 BPM
BH CV STRESS SPEED STAGE 1: 1.7
BH CV STRESS SPEED STAGE 2: 2.5
BH CV STRESS SPEED STAGE 3: 3.4
BH CV STRESS SPEED STAGE 4: 4.2
BH CV STRESS STAGE 1: 1
BH CV STRESS STAGE 2: 2
BH CV STRESS STAGE 3: 3
BH CV STRESS STAGE 4: 4
LEFT ATRIUM VOLUME INDEX: 15.6 ML/M2
LV EF NUC BP: 63 %
MAXIMAL PREDICTED HEART RATE: 172 BPM
PERCENT MAX PREDICTED HR: 84.88 %
STRESS BASELINE BP: NORMAL MMHG
STRESS BASELINE HR: 65 BPM
STRESS PERCENT HR: 100 %
STRESS POST ESTIMATED WORKLOAD: 13.7 METS
STRESS POST EXERCISE DUR MIN: 11 MIN
STRESS POST EXERCISE DUR SEC: 2 SEC
STRESS POST PEAK BP: NORMAL MMHG
STRESS POST PEAK HR: 146 BPM
STRESS TARGET HR: 146 BPM

## 2024-06-27 PROCEDURE — 0 TECHNETIUM SESTAMIBI: Performed by: SPECIALIST

## 2024-06-27 PROCEDURE — 93017 CV STRESS TEST TRACING ONLY: CPT

## 2024-06-27 PROCEDURE — A9500 TC99M SESTAMIBI: HCPCS | Performed by: SPECIALIST

## 2024-06-27 PROCEDURE — 93306 TTE W/DOPPLER COMPLETE: CPT

## 2024-06-27 PROCEDURE — 78452 HT MUSCLE IMAGE SPECT MULT: CPT

## 2024-06-27 RX ORDER — FLUOXETINE HYDROCHLORIDE 20 MG/1
20 CAPSULE ORAL DAILY
Qty: 45 CAPSULE | Refills: 0 | Status: SHIPPED | OUTPATIENT
Start: 2024-07-04 | End: 2024-08-18

## 2024-06-27 RX ORDER — FLUOXETINE 10 MG/1
10 CAPSULE ORAL DAILY
Qty: 7 CAPSULE | Refills: 0 | Status: SHIPPED | OUTPATIENT
Start: 2024-06-27 | End: 2024-07-04

## 2024-06-27 RX ORDER — DULOXETIN HYDROCHLORIDE 20 MG/1
20 CAPSULE, DELAYED RELEASE ORAL DAILY
Qty: 7 CAPSULE | Refills: 0 | Status: SHIPPED | OUTPATIENT
Start: 2024-06-27 | End: 2024-07-04

## 2024-06-27 RX ADMIN — TECHNETIUM TC 99M SESTAMIBI 1 DOSE: 1 INJECTION INTRAVENOUS at 12:15

## 2024-06-27 RX ADMIN — TECHNETIUM TC 99M SESTAMIBI 1 DOSE: 1 INJECTION INTRAVENOUS at 10:25

## 2024-06-27 NOTE — TELEPHONE ENCOUNTER
----- Message from Robyn Monzon sent at 6/27/2024  4:33 PM EDT -----  Medication list obtained from PCP's office.  Patient trialed Xanax, Lexapro, Vraylar, Viibryd in 2020.  Trialed Abilify, hydroxyzine, buspirone, Zoloft, amitriptyline in 2019.     Please call patient and let him know that I am going to start him on Prozac 10 mg daily x 7 days and increase to 20 mg daily thereafter for panic disorder.  At the same time I am going to reduce his Cymbalta to 20 mg p.o. daily x 7 days and then discontinue.     Side effect profile of Prozac is very similar to Cymbalta.  It has potential to cause weight gain, sexual dysfunction, insomnia, headache, nausea.  Please let me know if any negative side effects arise.  I feel that Prozac will be targeted for panic than Cymbalta is.

## 2024-06-27 NOTE — PROGRESS NOTES
Medication list obtained from PCP's office.  Patient trialed Xanax, Lexapro, Vraylar, Viibryd in 2020.  Trialed Abilify, hydroxyzine, buspirone, Zoloft, amitriptyline in 2019.    Please call patient and let him know that I am going to start him on Prozac 10 mg daily x 7 days and increase to 20 mg daily thereafter for panic disorder.  At the same time I am going to reduce his Cymbalta to 20 mg p.o. daily x 7 days and then discontinue.    Side effect profile of Prozac is very similar to Cymbalta.  It has potential to cause weight gain, sexual dysfunction, insomnia, headache, nausea.  Please let me know if any negative side effects arise.  I feel that Prozac will be targeted for panic than Cymbalta is.

## 2024-06-28 ENCOUNTER — TELEPHONE (OUTPATIENT)
Dept: FAMILY MEDICINE CLINIC | Facility: CLINIC | Age: 49
End: 2024-06-28
Payer: MEDICARE

## 2024-06-28 NOTE — TELEPHONE ENCOUNTER
Alejo luz and his insurance will not pay for brand name Klonopin.  Its needs to be sent in for generic

## 2024-07-01 DIAGNOSIS — F40.01 PANIC DISORDER WITH AGORAPHOBIA: ICD-10-CM

## 2024-07-01 RX ORDER — CLONAZEPAM 1 MG/1
TABLET ORAL
Qty: 37 TABLET | Refills: 0 | Status: SHIPPED | OUTPATIENT
Start: 2024-07-01

## 2024-07-12 ENCOUNTER — TELEPHONE (OUTPATIENT)
Dept: CARDIOLOGY | Facility: CLINIC | Age: 49
End: 2024-07-12
Payer: MEDICARE

## 2024-07-12 NOTE — TELEPHONE ENCOUNTER
Name: Alejo Wheatley      Relationship: Self      Best Callback Number: [phone]      [facility]UB PROVIDED THE RELAY MESSAGE FROM THE OFFICE      PATIENT: VOICED UNDERSTANDING AND HAS NO FURTHER QUESTIONS AT THIS TIME    ADDITIONAL INFORMATION:

## 2024-07-12 NOTE — TELEPHONE ENCOUNTER
Hub to relay.     Called pt to see if he had his lipid panel done. If not he will need to get it before his apt on 7/18. No answer LM. He will need to fast the night before his labs.

## 2024-07-17 ENCOUNTER — TELEPHONE (OUTPATIENT)
Dept: CARDIOLOGY | Facility: CLINIC | Age: 49
End: 2024-07-17
Payer: MEDICARE

## 2024-07-17 NOTE — TELEPHONE ENCOUNTER
Caller: Alejo Wheatley    Relationship: Self    Best call back number: [phone]    What orders are you requesting (i.e. lab or imaging): LABS    In what timeframe would the patient need to come in: ASAP    Where will you receive your lab/imaging services: [facility]    Additional notes: [phone].. [facility] OFFICE NUMBER. PLEASE FAX LAB ORDERS OVER.

## 2024-07-25 ENCOUNTER — OFFICE VISIT (OUTPATIENT)
Dept: PSYCHIATRY | Facility: CLINIC | Age: 49
End: 2024-07-25
Payer: MEDICARE

## 2024-07-25 VITALS
OXYGEN SATURATION: 99 % | HEART RATE: 92 BPM | DIASTOLIC BLOOD PRESSURE: 81 MMHG | BODY MASS INDEX: 28.01 KG/M2 | SYSTOLIC BLOOD PRESSURE: 147 MMHG | WEIGHT: 206.8 LBS | HEIGHT: 72 IN

## 2024-07-25 DIAGNOSIS — F40.01 PANIC DISORDER WITH AGORAPHOBIA: ICD-10-CM

## 2024-07-25 PROCEDURE — 1159F MED LIST DOCD IN RCRD: CPT

## 2024-07-25 PROCEDURE — 1160F RVW MEDS BY RX/DR IN RCRD: CPT

## 2024-07-25 PROCEDURE — 3079F DIAST BP 80-89 MM HG: CPT

## 2024-07-25 PROCEDURE — 3077F SYST BP >= 140 MM HG: CPT

## 2024-07-25 PROCEDURE — 99214 OFFICE O/P EST MOD 30 MIN: CPT

## 2024-07-25 RX ORDER — SYRINGE, DISPOSABLE, 1 ML
SYRINGE, EMPTY DISPOSABLE MISCELLANEOUS
COMMUNITY
Start: 2024-07-09

## 2024-07-25 RX ORDER — PROPRANOLOL HYDROCHLORIDE 20 MG/1
TABLET ORAL
Qty: 180 TABLET | OUTPATIENT
Start: 2024-07-25

## 2024-07-25 RX ORDER — TESTOSTERONE CYPIONATE 200 MG/ML
INJECTION, SOLUTION INTRAMUSCULAR
COMMUNITY
Start: 2024-07-09

## 2024-07-25 RX ORDER — PROPRANOLOL HYDROCHLORIDE 20 MG/1
TABLET ORAL
Qty: 60 TABLET | Refills: 1 | Status: SHIPPED | OUTPATIENT
Start: 2024-07-25

## 2024-07-25 RX ORDER — FLUOXETINE HYDROCHLORIDE 20 MG/1
20 CAPSULE ORAL DAILY
Qty: 45 CAPSULE | Refills: 0 | Status: SHIPPED | OUTPATIENT
Start: 2024-07-25 | End: 2024-09-08

## 2024-07-25 RX ORDER — CLONAZEPAM 1 MG/1
TABLET ORAL
Qty: 37 TABLET | Refills: 0 | Status: SHIPPED | OUTPATIENT
Start: 2024-07-25

## 2024-07-25 NOTE — PROGRESS NOTES
"Subjective   Alejo Wheatley is a 49 y.o. male who is here today for medication management follow-up encounter.    Chief Complaint: Anxiety and panic    HPI:  Patient was cross tapered from Cymbalta to Prozac.  He reports that initially with this transition he was experiencing a lot of sweating, dizziness.  He reports that sweating has improved with time.  Dizziness is still intermittently problematic.  Does note that is often from squatting to standing.  Does report that his only been occurring when he is outside in the heat.  He reports that he has noticed since transitioning that he is \"always been anxious but I have not plan panicked out.\"  Continues to note intermittent episodes of panic generally occurring with the need to leave his home.  There has been no hospital evaluations or EMS calls since last encounter.  He denies any overt feelings of depression at this time.  Does feel that physiologic response of anxiety exacerbates psychological response.  Reports that he often feels chest tightness, increased heart rate, sweating, shakiness, and feelings to escape when panic occurs.  No change with appetite. Body mass index is 28.04 kg/m².  Sleep is doing okay.  Denies self-harm.  Denies AVH.  Denies SI and HI.    Past Psych History: Patient has been previously diagnosed and treated for anxiety and panic by PCP.  Denies any outpatient or inpatient psychiatric providers.  Denies a history of TBI or seizures.  Denies any knowledge of exposure to illicit substance or toxins while in utero.    Previous Psych Meds: Patient reports that he has previously trialed and failed multiple medications but is unsure of what these are.  Currently on Cymbalta and clonazepam.  Reports recent dose increase in Cymbalta however recent reduction associated with increased blood pressure and worsening of panic.    Substance Abuse: Patient denies any history of illicit substance use, EtOH.  Does take clonazepam, prescribed.  Caffeine " intake.    Social History: Patient reports that he was born and raised locally to biological mom and dad.  1 brother.  10th grade education.  Self-employed rebuilding cars.  3 marriages.  2 marriages ended related to infidelity.  Current marriage x 7 years, healthy and supportive.  3 biological daughters.  3 stepdaughters.  1 grandson and 1 granddaughter.  Denies any previous incarcerations or pending litigation.  Denies any  service.      Family Psychiatric History:  family history includes Anxiety disorder in his father and paternal aunt.    Medical/Surgical History:  Past Medical History:   Diagnosis Date    Hyperlipidemia     Hypertension     Panic attack     Panic disorder      History reviewed. No pertinent surgical history.    No Known Allergies        Current Medications:   Current Outpatient Medications   Medication Sig Dispense Refill    B-D SYRINGE LUER-SHANTA 1CC 1 ML misc use TO INJECT testosterone      clonazePAM (KlonoPIN) 1 MG tablet 1 mg every morning for panic and may take 0.5 mg every evening as needed for panic 37 tablet 0    FLUoxetine (PROzac) 20 MG capsule Take 1 capsule by mouth Daily for 45 days. 45 capsule 0    Testosterone Cypionate (DEPOTESTOTERONE CYPIONATE) 200 MG/ML injection INJECT 0.2ml SUBCUTANEOUS TWICE A WEEK      nitroglycerin (NITROSTAT) 0.4 MG SL tablet 1 under the tongue as needed for angina, may repeat q5mins for up three doses 25 tablet 3    propranolol (INDERAL) 20 MG tablet Take 10 mg twice daily X 3 days, may increase to 20 mg twice daily if symptoms are persistent 60 tablet 1     No current facility-administered medications for this visit.         Review of Systems   Constitutional: Negative.    HENT: Negative.     Eyes: Negative.    Respiratory: Negative.     Cardiovascular: Negative.    Gastrointestinal: Negative.    Endocrine: Negative.    Genitourinary: Negative.    Musculoskeletal: Negative.    Skin: Negative.    Allergic/Immunologic: Negative.    Neurological:  "Negative.    Hematological: Negative.    Psychiatric/Behavioral:  The patient is nervous/anxious.     denies HEENT, cardiovascular, respiratory, liver, renal, GI/, endocrine, neuro, DERM, hematology, immunology, musculoskeletal disorders.    Objective   Physical Exam  Vitals reviewed.   Constitutional:       Appearance: Normal appearance.   HENT:      Head: Normocephalic.      Nose: Nose normal.      Mouth/Throat:      Mouth: Mucous membranes are moist.      Pharynx: Oropharynx is clear.   Eyes:      Extraocular Movements: Extraocular movements intact.      Pupils: Pupils are equal, round, and reactive to light.   Cardiovascular:      Rate and Rhythm: Normal rate.   Pulmonary:      Effort: Pulmonary effort is normal.   Musculoskeletal:         General: Normal range of motion.      Cervical back: Normal range of motion.   Skin:     General: Skin is warm and dry.   Neurological:      General: No focal deficit present.      Mental Status: He is alert and oriented to person, place, and time.   Psychiatric:         Attention and Perception: Attention and perception normal. He is attentive.         Mood and Affect: Affect normal. Mood is anxious. Affect is not tearful.         Speech: Speech normal.         Behavior: Behavior normal. Behavior is cooperative.         Thought Content: Thought content normal.         Cognition and Memory: Cognition and memory normal.         Judgment: Judgment normal. Judgment is not impulsive.     Blood pressure 147/81, pulse 92, height 182.9 cm (72.01\"), weight 93.8 kg (206 lb 12.8 oz), SpO2 99%.    Mental Status Exam:   Hygiene:   good  Cooperation:  Cooperative  Eye Contact:  Good  Psychomotor Behavior:  Appropriate  Affect:   tearful  Hopelessness: Denies  Speech:  Normal  Thought Process:  Goal directed and Linear  Thought Content:  Normal  Suicidal:  None  Homicidal:  None  Hallucinations:  None  Delusion:  None  Memory:  Intact  Orientation:  Person, Place, Time, and " Situation  Reliability:  fair  Insight:  Fair  Judgement:  Fair  Impulse Control:  Fair  Physical/Medical Issues:  No       Short-term goals: Patient will be compliant with clinic appointments.  Patient will be engaged in therapy, medication compliant with minimal side effects. Patient  will report decrease of symptoms and frequency.    Long-term goals: Patient will have minimal symptoms of  with continued medication management. Patient will be compliant with treatment and appointments.      Strengths: motivation, insight, support   Weaknesses: coping skills    Prognosis: Guarded dependent on medication/follow up and treatment plan compliance.  Functional Status:severe impairment in areas of daily functioning.      Assessment & Plan   Diagnoses and all orders for this visit:    1. Panic disorder with agoraphobia  -     FLUoxetine (PROzac) 20 MG capsule; Take 1 capsule by mouth Daily for 45 days.  Dispense: 45 capsule; Refill: 0  -     clonazePAM (KlonoPIN) 1 MG tablet; 1 mg every morning for panic and may take 0.5 mg every evening as needed for panic  Dispense: 37 tablet; Refill: 0  -     propranolol (INDERAL) 20 MG tablet; Take 10 mg twice daily X 3 days, may increase to 20 mg twice daily if symptoms are persistent  Dispense: 60 tablet; Refill: 1      -UDS reviewed and appropriate  -Bridger reviewed and appropriate  -Continue Prozac 20 mg p.o. daily for anxiety and panic  -Continue clonazepam 1 mg PO qAM and 0.5 mg daily PRN for panic   -Start propranolol 10 mg p.o. twice daily for anxiety x 3 days, then may increase to 20 mg p.o. twice daily thereafter for anxiety/panic  -Recommend psychotherapy   -Medication submitted to pharmacy at this time     Discussed medication and psychotherapy options.   Plan at this time will be to continue on Prozac and clonazepam at the above dosing without change.  Symptoms and about above I do have suspicion that this is related to tapering/discontinuation of Cymbalta.  As patient has  noticed that dizziness is primarily from squatting to standing and outside in the heat I recommend increasing hydration to see if this helps to improve the above-noted symptoms.  Also discussed with patient transitioning dose time of Prozac.  I am going to start propranolol to target the physiologic symptoms/somatic complaints which patient is noticing associated with panic.  I have reiterated with him side effects that can be seen with each medication individually as well as in combination.  I discussed that beta-blockers to potential to reduce blood pressure, reduce heart rate, dry mouth, dizziness, fatigue. Discussed the risks, benefits, and side effects of the medication; client acknowledged and verbally consented.  Patient is aware to contact the [facility] with any worsening of symptom.  Patient is agreeable to go to the ER or call 911 should they begin SI/HI.    The patient is on a long-term benzodiazepine therapy which is needed for stable, consistent, and improved quality of life.  We have discussed potential risks and side effects including early onset dementia, addiction with continued use, sedation, fatigue, depression, dizziness, ataxia, slurred speech, weakness, forgetfulness, confusion, hyperexcitability, nervousness, hallucinations, mayra or hypomania, hypotension, hypersalivation, dry mouth, respiratory depression especially when taken with CNS depressants and in overdose, rare hepatic dysfunction, rare renal dysfunction, blood dyscrasias, increased risk of falls, and impaired driving.  The patient is advised to not drive when taking a controlled substance.  The patient is also informed that the medication is to be used by the patient only, it is to be taken only as prescribed/directed, and to avoid any combined use of alcohol/ETOH or other substances unless prescribed and as directed by a Provider.  The patient verbalizes understanding and willingness in their own words to accept the potential  side effects and risks for a better quality of life, and is currently in compliance and agreement with the plan of care.  A controlled substance agreement is on file, and the patient is in agreement with the terms of the controlled substance agreement.  The patient is advised that CATHY reports will be reviewed periodically, as well as random drug screens will be performed periodically, and as needed.      Return in about 4 weeks (around 8/22/2024), or if symptoms worsen or fail to improve, for Next scheduled follow up.        This document has been electronically signed by JUAN Mayfield   July 25, 2024 12:53 EDT     Errors in dictation may reflect use of voice recognition software and not all errors in transcription may have been detected prior to signing.

## 2024-07-29 ENCOUNTER — TELEPHONE (OUTPATIENT)
Dept: FAMILY MEDICINE CLINIC | Facility: CLINIC | Age: 49
End: 2024-07-29
Payer: MEDICARE

## 2024-07-29 NOTE — TELEPHONE ENCOUNTER
----- Message from Robyn Monzon sent at 7/29/2024  1:12 PM EDT -----  Please call and see how rosi is doing with medication changes. He was started on propanolol

## 2024-08-12 ENCOUNTER — TELEPHONE (OUTPATIENT)
Dept: FAMILY MEDICINE CLINIC | Facility: CLINIC | Age: 49
End: 2024-08-12
Payer: MEDICARE

## 2024-08-12 ENCOUNTER — OFFICE VISIT (OUTPATIENT)
Dept: PSYCHIATRY | Facility: CLINIC | Age: 49
End: 2024-08-12
Payer: MEDICARE

## 2024-08-12 ENCOUNTER — TELEPHONE (OUTPATIENT)
Dept: CARDIOLOGY | Facility: CLINIC | Age: 49
End: 2024-08-12

## 2024-08-12 VITALS
HEART RATE: 88 BPM | DIASTOLIC BLOOD PRESSURE: 78 MMHG | SYSTOLIC BLOOD PRESSURE: 126 MMHG | OXYGEN SATURATION: 97 % | HEIGHT: 72 IN | WEIGHT: 207.6 LBS | BODY MASS INDEX: 28.12 KG/M2

## 2024-08-12 DIAGNOSIS — F40.01 PANIC DISORDER WITH AGORAPHOBIA: ICD-10-CM

## 2024-08-12 PROCEDURE — 3074F SYST BP LT 130 MM HG: CPT

## 2024-08-12 PROCEDURE — 1159F MED LIST DOCD IN RCRD: CPT

## 2024-08-12 PROCEDURE — 3078F DIAST BP <80 MM HG: CPT

## 2024-08-12 PROCEDURE — 99214 OFFICE O/P EST MOD 30 MIN: CPT

## 2024-08-12 PROCEDURE — 1160F RVW MEDS BY RX/DR IN RCRD: CPT

## 2024-08-12 RX ORDER — CLONAZEPAM 1 MG/1
TABLET ORAL
Qty: 45 TABLET | Refills: 0 | Status: SHIPPED | OUTPATIENT
Start: 2024-08-12

## 2024-08-12 RX ORDER — PAROXETINE HYDROCHLORIDE 20 MG/1
20 TABLET, FILM COATED ORAL DAILY
Qty: 46 TABLET | Refills: 0 | Status: SHIPPED | OUTPATIENT
Start: 2024-08-26 | End: 2024-10-11

## 2024-08-12 RX ORDER — PAROXETINE 10 MG/1
10 TABLET, FILM COATED ORAL DAILY
Qty: 14 TABLET | Refills: 0 | Status: SHIPPED | OUTPATIENT
Start: 2024-08-12 | End: 2024-08-26

## 2024-08-12 RX ORDER — PROPRANOLOL HYDROCHLORIDE 10 MG/1
TABLET ORAL
Qty: 90 TABLET | Refills: 1 | Status: SHIPPED | OUTPATIENT
Start: 2024-08-12
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
## 2024-08-12 NOTE — TELEPHONE ENCOUNTER
Alejo's wife called and they went on vacation last week and he didn't want to leave the room for 3 days.  They left early and he was having bad thoughts, like he would be better off not here.  No SI/HI thoughts.  He stopped taking the Prozac last Thursday.  She says he is very depressed

## 2024-08-12 NOTE — TELEPHONE ENCOUNTER
Caller: ALBA GARCIA    Relationship to patient: Emergency Contact    Best call back number: [phone]    Patient is needing: PATIENT WOULD LIKE LAB ORDERS FAXED TO [facility] SO HE CAN GET THEM DONE BEFORE HIS APPOINTMENT.

## 2024-08-13 RX ORDER — PROPRANOLOL HYDROCHLORIDE 10 MG/1
TABLET ORAL
Qty: 270 TABLET | OUTPATIENT
Start: 2024-08-13

## 2024-08-14 ENCOUNTER — OFFICE VISIT (OUTPATIENT)
Dept: CARDIOLOGY | Facility: CLINIC | Age: 49
End: 2024-08-14
Payer: MEDICARE

## 2024-08-14 VITALS
BODY MASS INDEX: 28.17 KG/M2 | DIASTOLIC BLOOD PRESSURE: 78 MMHG | SYSTOLIC BLOOD PRESSURE: 120 MMHG | WEIGHT: 208 LBS | HEART RATE: 71 BPM | HEIGHT: 72 IN | OXYGEN SATURATION: 95 %

## 2024-08-14 DIAGNOSIS — I10 ESSENTIAL HYPERTENSION: ICD-10-CM

## 2024-08-14 DIAGNOSIS — E78.5 DYSLIPIDEMIA: ICD-10-CM

## 2024-08-14 DIAGNOSIS — F17.210 CIGARETTE SMOKER: Primary | ICD-10-CM

## 2024-08-14 PROCEDURE — 1159F MED LIST DOCD IN RCRD: CPT | Performed by: NURSE PRACTITIONER

## 2024-08-14 PROCEDURE — 99214 OFFICE O/P EST MOD 30 MIN: CPT | Performed by: NURSE PRACTITIONER

## 2024-08-14 PROCEDURE — 3074F SYST BP LT 130 MM HG: CPT | Performed by: NURSE PRACTITIONER

## 2024-08-14 PROCEDURE — 3078F DIAST BP <80 MM HG: CPT | Performed by: NURSE PRACTITIONER

## 2024-08-14 PROCEDURE — 1160F RVW MEDS BY RX/DR IN RCRD: CPT | Performed by: NURSE PRACTITIONER

## 2024-08-14 NOTE — PROGRESS NOTES
[facility]             [facility] JUAN Osorio Tammy L, JUAN  Alejo Wheatley  1975 08/14/2024    Patient Active Problem List   Diagnosis    Precordial chest pain    Essential hypertension    Dyslipidemia    Cigarette smoker       Dear Daniela Delaney APRN:    Subjective     Chief Complaint   Patient presents with    Follow-up       HPI:     This is a 49 y.o. male with known past medical history of tobacco abuse, dyslipidemia and hypertension    Alejo Wheatley presents today for routine cardiology follow up.  Patient states he has been doing overall well since his last visit.  Denies any chest pain or shortness of breath.  Had echocardiogram which showed normal LV function.  Nuclear stress test was negative for ischemia.  Continues to smoke.  Reports recent lipid panel by PCP which is not available for review today.    Diagnostic Testing  Echocardiogram 6/2024: EF 61 to 65%  Nuclear stress test 6/2024: No evidence of ischemia     All other systems were reviewed and were negative.    Patient Active Problem List   Diagnosis    Precordial chest pain    Essential hypertension    Dyslipidemia    Cigarette smoker       family history includes Anxiety disorder in his father and paternal aunt.     reports that he has been smoking cigarettes. He has a 22.5 pack-year smoking history. He does not have any smokeless tobacco history on file. He reports that he does not drink alcohol and does not use drugs.    No Known Allergies      Current Outpatient Medications:     B-D SYRINGE LUER-SHANTA 1CC 1 ML misc, use TO INJECT testosterone, Disp: , Rfl:     clonazePAM (KlonoPIN) 1 MG tablet, 1 mg every morning for panic and may take 0.5 mg every evening as needed for panic, Disp: 45 tablet, Rfl: 0    PARoxetine (Paxil) 10 MG tablet, Take 1 tablet by mouth Daily for 14 days., Disp: 14 tablet, Rfl: 0    [START ON 8/26/2024] PARoxetine (PAXIL) 20 MG tablet, Take 1 tablet by mouth Daily for  46 days., Disp: 46 tablet, Rfl: 0    propranolol (INDERAL) 10 MG tablet, 10 mg twice daily and may take 10 mg every evening as needed., Disp: 90 tablet, Rfl: 1    Testosterone Cypionate (DEPOTESTOTERONE CYPIONATE) 200 MG/ML injection, INJECT 0.2ml SUBCUTANEOUS TWICE A WEEK, Disp: , Rfl:     nitroglycerin (NITROSTAT) 0.4 MG SL tablet, 1 under the tongue as needed for angina, may repeat q5mins for up three doses (Patient not taking: Reported on 8/14/2024), Disp: 25 tablet, Rfl: 3      Physical Exam:  I have reviewed the patient's current vital signs as documented in the patient's EMR.   Vitals:    08/14/24 1322   BP: 120/78   Pulse: 71   SpO2: 95%     Body mass index is 28.2 kg/m².       08/14/24  1322   Weight: 94.3 kg (208 lb)      Physical Exam  Constitutional:       General: He is not in acute distress.     Appearance: Normal appearance. He is well-developed.   HENT:      Head: Normocephalic and atraumatic.      Mouth/Throat:      Mouth: Mucous membranes are moist.   Eyes:      Extraocular Movements: Extraocular movements intact.      Pupils: Pupils are equal, round, and reactive to light.   Neck:      Vascular: No JVD.   Cardiovascular:      Rate and Rhythm: Normal rate and regular rhythm.      Heart sounds: Normal heart sounds. No murmur heard.     No S3 or S4 sounds.   Pulmonary:      Effort: Pulmonary effort is normal. No respiratory distress.      Breath sounds: Normal breath sounds. No wheezing.   Abdominal:      General: Bowel sounds are normal. There is no distension.      Palpations: Abdomen is soft. There is no hepatomegaly.      Tenderness: There is no abdominal tenderness.   Musculoskeletal:         General: Normal range of motion.      Cervical back: Normal range of motion and neck supple.   Skin:     General: Skin is warm and dry.      Coloration: Skin is not jaundiced or pale.   Neurological:      General: No focal deficit present.      Mental Status: He is alert and oriented to person, place, and  "time. Mental status is at baseline.   Psychiatric:         Mood and Affect: Mood normal.         Behavior: Behavior normal.         Thought Content: Thought content normal.         Judgment: Judgment normal.            DATA REVIEWED:     TTE/LAZARA:  Results for orders placed during the hospital encounter of 06/27/24    Adult Transthoracic Echo Complete w/ Color, Spectral and Contrast if necessary per protocol    Interpretation Summary    Left ventricular systolic function is normal. Left ventricular ejection fraction appears to be 61 - 65%.    Left ventricular diastolic function was normal.      Laboratory evaluations:    Lab Results   Component Value Date    GLUCOSE 93 05/27/2024    BUN 6 05/27/2024    CREATININE 1.10 05/27/2024    BCR 5.5 (L) 05/27/2024    K 3.9 05/27/2024    CO2 24.0 05/27/2024    CALCIUM 9.3 05/27/2024    ALBUMIN 4.6 05/27/2024    LABIL2 1.4 (L) 05/20/2016    AST 20 05/27/2024    ALT 15 05/27/2024     Lab Results   Component Value Date    WBC 9.20 05/27/2024    HGB 15.7 05/27/2024    HCT 46.2 05/27/2024    MCV 90.1 05/27/2024     05/27/2024     No results found for: \"CHOL\", \"CHLPL\", \"TRIG\", \"HDL\", \"LDL\", \"LDLDIRECT\"  No results found for: \"TSH\", \"J8FASTR\", \"D6LOOIW\", \"THYROIDAB\"  No results found for: \"HGBA1C\"  Lab Results   Component Value Date    ALT 15 05/27/2024     No results found for: \"HGBA1C\"  Lab Results   Component Value Date    CREATININE 1.10 05/27/2024     No results found for: \"IRON\", \"TIBC\", \"FERRITIN\"  Lab Results   Component Value Date    INR 0.93 05/20/2016    PROTIME 10.5 05/20/2016           --------------------------------------------------------------------------------------------------------------------------    ASSESSMENT/PLAN:      Diagnosis Plan   1. Cigarette smoker        2. Essential hypertension        3. Dyslipidemia            Essential hypertension  Blood pressure well-controlled.  Continue current management.  Echocardiogram showed normal LV function.  " Nuclear stress test was negative for ischemia.  No further workup at this time as patient is asymptomatic.    Dyslipidemia  Had recent lab panel which is available for review today.  Follow-up on results and add statin if needed.    3.  Tobacco abuse  Continues to smoke.  Discussed with patient about the importance of smoking cessation.        This document has been @Electronically signed by JUAN Sahu, 08/14/24, 1:17 PM EDT.       Dictated Utilizing Dragon Dictation: Part of this note may be an electronic transcription/translation of spoken language to printed text using the Dragon Dictation System.    Follow-up appointment and medication changes provided in hand delivered After Visit Summary as well as reviewed in the room.

## 2024-08-15 ENCOUNTER — TELEPHONE (OUTPATIENT)
Dept: CARDIOLOGY | Facility: CLINIC | Age: 49
End: 2024-08-15
Payer: MEDICARE

## 2024-08-15 NOTE — TELEPHONE ENCOUNTER
Requested.     ----- Message from Katelynn Osorio sent at 8/14/2024  1:58 PM EDT -----  Please request labs from PCP

## 2024-09-23 ENCOUNTER — OFFICE VISIT (OUTPATIENT)
Dept: PSYCHIATRY | Facility: CLINIC | Age: 49
End: 2024-09-23
Payer: MEDICARE

## 2024-09-23 VITALS
SYSTOLIC BLOOD PRESSURE: 127 MMHG | DIASTOLIC BLOOD PRESSURE: 77 MMHG | HEART RATE: 83 BPM | BODY MASS INDEX: 28.12 KG/M2 | WEIGHT: 207.6 LBS | OXYGEN SATURATION: 97 % | HEIGHT: 72 IN

## 2024-09-23 DIAGNOSIS — F40.01 PANIC DISORDER WITH AGORAPHOBIA: Primary | ICD-10-CM

## 2024-09-23 DIAGNOSIS — F40.01 PANIC DISORDER WITH AGORAPHOBIA: ICD-10-CM

## 2024-09-23 PROCEDURE — 1159F MED LIST DOCD IN RCRD: CPT

## 2024-09-23 PROCEDURE — 3074F SYST BP LT 130 MM HG: CPT

## 2024-09-23 PROCEDURE — 99213 OFFICE O/P EST LOW 20 MIN: CPT

## 2024-09-23 PROCEDURE — 3078F DIAST BP <80 MM HG: CPT

## 2024-09-23 PROCEDURE — 1160F RVW MEDS BY RX/DR IN RCRD: CPT

## 2024-09-23 RX ORDER — CLONAZEPAM 1 MG/1
TABLET ORAL
Qty: 45 TABLET | Refills: 2 | Status: SHIPPED | OUTPATIENT
Start: 2024-09-23

## 2024-09-23 RX ORDER — PAROXETINE 20 MG/1
20 TABLET, FILM COATED ORAL DAILY
Qty: 90 TABLET | Refills: 0 | Status: SHIPPED | OUTPATIENT
Start: 2024-09-23 | End: 2024-12-22

## 2024-09-23 RX ORDER — PROPRANOLOL HCL 10 MG
TABLET ORAL
Qty: 90 TABLET | Refills: 1 | Status: SHIPPED | OUTPATIENT
Start: 2024-09-23

## 2024-09-24 RX ORDER — PROPRANOLOL HCL 10 MG
TABLET ORAL
Qty: 270 TABLET | OUTPATIENT
Start: 2024-09-24

## 2024-11-25 ENCOUNTER — OFFICE VISIT (OUTPATIENT)
Dept: PSYCHIATRY | Facility: CLINIC | Age: 49
End: 2024-11-25
Payer: MEDICARE

## 2024-11-25 VITALS
DIASTOLIC BLOOD PRESSURE: 90 MMHG | OXYGEN SATURATION: 98 % | HEART RATE: 75 BPM | HEIGHT: 72 IN | SYSTOLIC BLOOD PRESSURE: 150 MMHG | BODY MASS INDEX: 29.23 KG/M2 | WEIGHT: 215.8 LBS

## 2024-11-25 DIAGNOSIS — F43.21 GRIEF: ICD-10-CM

## 2024-11-25 DIAGNOSIS — F40.01 PANIC DISORDER WITH AGORAPHOBIA: Primary | ICD-10-CM

## 2024-11-25 PROCEDURE — 3080F DIAST BP >= 90 MM HG: CPT

## 2024-11-25 PROCEDURE — 1160F RVW MEDS BY RX/DR IN RCRD: CPT

## 2024-11-25 PROCEDURE — 3077F SYST BP >= 140 MM HG: CPT

## 2024-11-25 PROCEDURE — 1159F MED LIST DOCD IN RCRD: CPT

## 2024-11-25 PROCEDURE — 99214 OFFICE O/P EST MOD 30 MIN: CPT

## 2024-11-25 RX ORDER — CLONAZEPAM 1 MG/1
TABLET ORAL
Qty: 45 TABLET | Refills: 2 | Status: SHIPPED | OUTPATIENT
Start: 2024-12-19

## 2024-11-25 RX ORDER — PAROXETINE 30 MG/1
30 TABLET, FILM COATED ORAL DAILY
Qty: 90 TABLET | Refills: 0 | Status: SHIPPED | OUTPATIENT
Start: 2024-11-25 | End: 2025-02-23

## 2024-11-25 NOTE — PROGRESS NOTES
"Subjective   Alejo Wheatley is a 49 y.o. male who is here today for medication management follow up encounter. Presents with wife, Yris, whom he gives permission to speak in front of.       Chief Complaint: Anxiety and panic    HPI:  Patient denies negative side effects associated with current regimen.  He reports that he has not experienced any episode of panic.  He does note that since last encounter he has had an increase in \"feeling worried.\"  He reports that he finds himself \"focusing more on my breathing again.\"  Reports this has been going on worsening over the last few weeks.  He reports that \"I think I need to go up to the higher dose of Paxil.\"  Denies any depression.  Only taking propranolol as needed.  Reports that last week his mother passed away unexpectedly and that he has been experiencing grief associated with her loss.  Sleep has been doing well overall until the loss of his mom in which it has been more difficult for him to initiate sleep.  Overall still is sleeping 7+ hours nightly.  No appetite concerns. Body mass index is 29.26 kg/m².Denies self-harm.  Denies AVH.  Denies SI and HI.    Past Psych History: Patient has been previously diagnosed and treated for anxiety and panic by PCP.  Denies any outpatient or inpatient psychiatric providers.  Denies a history of TBI or seizures.  Denies any knowledge of exposure to illicit substance or toxins while in utero.    Previous Psych Meds: Patient reports that he has previously trialed and failed multiple medications but is unsure of what these are.  Currently on Cymbalta and clonazepam.  Reports recent dose increase in Cymbalta however recent reduction associated with increased blood pressure and worsening of panic.    Substance Abuse: Patient denies any history of illicit substance use, EtOH.  Does take clonazepam, prescribed.  Caffeine intake.    Social History: Patient reports that he was born and raised locally to biological mom and dad.  1 brother. "  10th grade education.  Self-employed rebuilding cars.  3 marriages.  2 marriages ended related to infidelity.  Current marriage x 7 years, healthy and supportive.  3 biological daughters.  3 stepdaughters.  1 grandson and 1 granddaughter.  Denies any previous incarcerations or pending litigation.  Denies any  service.      Family Psychiatric History:  family history includes Anxiety disorder in his father and paternal aunt.    Medical/Surgical History:  Past Medical History:   Diagnosis Date    Hyperlipidemia     Hypertension     Panic attack     Panic disorder      History reviewed. No pertinent surgical history.    No Known Allergies        Current Medications:   Current Outpatient Medications   Medication Sig Dispense Refill    [START ON 12/19/2024] clonazePAM (KlonoPIN) 1 MG tablet 1 mg every morning for panic and may take 0.5 mg every evening as needed for panic 45 tablet 2    PARoxetine (PAXIL) 30 MG tablet Take 1 tablet by mouth Daily for 90 days. 90 tablet 0    B-D SYRINGE LUER-SHANTA 1CC 1 ML misc use TO INJECT testosterone      propranolol (INDERAL) 10 MG tablet 10 mg twice daily and may take 10 mg every evening as needed. 90 tablet 1    Testosterone Cypionate (DEPOTESTOTERONE CYPIONATE) 200 MG/ML injection INJECT 0.2ml SUBCUTANEOUS TWICE A WEEK       No current facility-administered medications for this visit.         Review of Systems   Constitutional: Negative.    HENT: Negative.     Eyes: Negative.    Respiratory: Negative.     Cardiovascular: Negative.    Gastrointestinal: Negative.    Endocrine: Negative.    Genitourinary: Negative.    Musculoskeletal: Negative.    Skin: Negative.    Allergic/Immunologic: Negative.    Neurological: Negative.    Hematological: Negative.    Psychiatric/Behavioral:  Negative for confusion. The patient is not nervous/anxious.     denies HEENT, cardiovascular, respiratory, liver, renal, GI/, endocrine, neuro, DERM, hematology, immunology, musculoskeletal  "disorders.    Objective   Physical Exam  Vitals reviewed.   Constitutional:       Appearance: Normal appearance.   HENT:      Head: Normocephalic.      Nose: Nose normal.      Mouth/Throat:      Mouth: Mucous membranes are moist.      Pharynx: Oropharynx is clear.   Eyes:      Extraocular Movements: Extraocular movements intact.      Pupils: Pupils are equal, round, and reactive to light.   Cardiovascular:      Rate and Rhythm: Normal rate.   Pulmonary:      Effort: Pulmonary effort is normal.   Musculoskeletal:         General: Normal range of motion.      Cervical back: Normal range of motion.   Skin:     General: Skin is warm and dry.   Neurological:      General: No focal deficit present.      Mental Status: He is alert and oriented to person, place, and time.   Psychiatric:         Attention and Perception: Attention and perception normal. He is attentive.         Mood and Affect: Mood and affect normal. Mood is not anxious. Affect is not tearful.         Speech: Speech normal.         Behavior: Behavior normal. Behavior is cooperative.         Thought Content: Thought content normal.         Cognition and Memory: Cognition and memory normal.         Judgment: Judgment normal. Judgment is not impulsive.     Blood pressure 150/90, pulse 75, height 182.9 cm (72.01\"), weight 97.9 kg (215 lb 12.8 oz), SpO2 98%.    Mental Status Exam:   Hygiene:   good  Cooperation:  Cooperative  Eye Contact:  Good  Psychomotor Behavior:  Appropriate  Affect:   Appropriate   Hopelessness: Denies  Speech:  Normal  Thought Process:  Goal directed and Linear  Thought Content:  Normal  Suicidal:  None  Homicidal:  None  Hallucinations:  None  Delusion:  None  Memory:  Intact  Orientation:  Person, Place, Time, and Situation  Reliability:  fair  Insight:  Fair  Judgement:  Fair  Impulse Control:  Fair  Physical/Medical Issues:  No       Short-term goals: Patient will be compliant with clinic appointments.  Patient will be engaged in " therapy, medication compliant with minimal side effects. Patient  will report decrease of symptoms and frequency.    Long-term goals: Patient will have minimal symptoms of  with continued medication management. Patient will be compliant with treatment and appointments.      Strengths: motivation, insight, support   Weaknesses: coping skills    Prognosis: Guarded dependent on medication/follow up and treatment plan compliance.  Functional Status:severe impairment in areas of daily functioning.      Assessment & Plan   Diagnoses and all orders for this visit:    1. Panic disorder with agoraphobia (Primary)  -     PARoxetine (PAXIL) 30 MG tablet; Take 1 tablet by mouth Daily for 90 days.  Dispense: 90 tablet; Refill: 0  -     clonazePAM (KlonoPIN) 1 MG tablet; 1 mg every morning for panic and may take 0.5 mg every evening as needed for panic  Dispense: 45 tablet; Refill: 2    2. Grief            -Bridger reviewed and appropriate   -Increase Paxil to 30 mg PO daily for panic   -Continue clonazepam 1 mg PO qAM and 0.5 mg daily PRN for panic   -Continue propranolol 10 mg p.o. twice daily for anxiety and may take 1 tablet nightly PRN for panic   -Recommend psychotherapy   -Medication submitted to pharmacy at this time     Discussed medication and psychotherapy options.  Patient is to continue clonazepam and propranolol without change.  Increasing Paxil as above.  Discussed the risks, benefits, and side effects of the medication; client acknowledged and verbally consented.  Patient is aware to contact the [facility] with any worsening of symptom.  Patient is agreeable to go to the ER or call 911 should they begin SI/HI.      Return in about 3 months (around 2/25/2025), or if symptoms worsen or fail to improve, for Next scheduled follow up.        This document has been electronically signed by JUAN Mayfield   November 25, 2024 15:16 EST     Errors in dictation may reflect use of voice recognition software and not  all errors in transcription may have been detected prior to signing.

## 2024-12-10 ENCOUNTER — TELEPHONE (OUTPATIENT)
Dept: FAMILY MEDICINE CLINIC | Facility: CLINIC | Age: 49
End: 2024-12-10
Payer: MEDICARE

## 2024-12-10 DIAGNOSIS — F40.01 PANIC DISORDER WITH AGORAPHOBIA: ICD-10-CM

## 2024-12-10 RX ORDER — PAROXETINE 20 MG/1
30 TABLET, FILM COATED ORAL DAILY
Qty: 135 TABLET | Refills: 0 | Status: SHIPPED | OUTPATIENT
Start: 2024-12-10 | End: 2024-12-10 | Stop reason: SDUPTHER

## 2024-12-10 RX ORDER — PAROXETINE 20 MG/1
20 TABLET, FILM COATED ORAL DAILY
Qty: 90 TABLET | Refills: 0 | Status: SHIPPED | OUTPATIENT
Start: 2024-12-10 | End: 2025-03-10

## 2024-12-10 NOTE — TELEPHONE ENCOUNTER
Yris called and Alejo is having issues with his muscles twitching or jerking since you increased the dose on his Paxil.  It started late last week and progressively gotten worse.      Please advise

## 2025-01-20 ENCOUNTER — TELEPHONE (OUTPATIENT)
Dept: FAMILY MEDICINE CLINIC | Facility: CLINIC | Age: 50
End: 2025-01-20
Payer: MEDICARE

## 2025-01-20 ENCOUNTER — OFFICE VISIT (OUTPATIENT)
Dept: PSYCHIATRY | Facility: CLINIC | Age: 50
End: 2025-01-20
Payer: MEDICARE

## 2025-01-20 VITALS
HEART RATE: 61 BPM | WEIGHT: 221.8 LBS | OXYGEN SATURATION: 97 % | SYSTOLIC BLOOD PRESSURE: 151 MMHG | HEIGHT: 72 IN | DIASTOLIC BLOOD PRESSURE: 90 MMHG | BODY MASS INDEX: 30.04 KG/M2

## 2025-01-20 DIAGNOSIS — F40.01 PANIC DISORDER WITH AGORAPHOBIA: ICD-10-CM

## 2025-01-20 PROCEDURE — 1160F RVW MEDS BY RX/DR IN RCRD: CPT

## 2025-01-20 PROCEDURE — 99213 OFFICE O/P EST LOW 20 MIN: CPT

## 2025-01-20 PROCEDURE — 3077F SYST BP >= 140 MM HG: CPT

## 2025-01-20 PROCEDURE — 3080F DIAST BP >= 90 MM HG: CPT

## 2025-01-20 PROCEDURE — 1159F MED LIST DOCD IN RCRD: CPT

## 2025-01-20 NOTE — TELEPHONE ENCOUNTER
Patient was seen today and said he didn't let Robyn know he needs refills on both meds paxel and klonopin

## 2025-01-20 NOTE — PROGRESS NOTES
"Subjective   Alejo Wheatley is a 49 y.o. male who is here today for medication management follow up encounter. Presents with wife, Yris, whom he gives permission to speak in front of.       Chief Complaint: Anxiety and panic    HPI:  Patient denies negative side effects associated with current regimen.  At last encounter patient was increased to Paxil 30 mg daily, unable to tolerate this related to \"muscle twitching\" subsequently he was reduced back to Paxil 20 mg daily in which this had resolved.  He reports that overall he feels that he is doing okay.  He has had a few episodes of panic however has been able to ground himself and continue on with what he was doing.  Reports that overall he does still feel that he is anxious when he leaves the house however is able to follow through with this.  Denies any depression.  Only taking propranolol as needed still.  Sleep is appropriate at this time.  Appetite is doing well.Body mass index is 30.07 kg/m². Denies self-harm.  Denies AVH.  Denies SI and HI.    Past Psych History: Patient has been previously diagnosed and treated for anxiety and panic by PCP.  Denies any outpatient or inpatient psychiatric providers.  Denies a history of TBI or seizures.  Denies any knowledge of exposure to illicit substance or toxins while in utero.    Previous Psych Meds: Patient reports that he has previously trialed and failed multiple medications but is unsure of what these are.  Currently on Cymbalta and clonazepam.  Reports recent dose increase in Cymbalta however recent reduction associated with increased blood pressure and worsening of panic.    Substance Abuse: Patient denies any history of illicit substance use, EtOH.  Does take clonazepam, prescribed.  Caffeine intake.    Social History: Patient reports that he was born and raised locally to biological mom and dad.  1 brother.  10th grade education.  Self-employed rebuilding cars.  3 marriages.  2 marriages ended related to " infidelity.  Current marriage x 7 years, healthy and supportive.  3 biological daughters.  3 stepdaughters.  1 grandson and 1 granddaughter.  Denies any previous incarcerations or pending litigation.  Denies any  service.      Family Psychiatric History:  family history includes Anxiety disorder in his father and paternal aunt.    Medical/Surgical History:  Past Medical History:   Diagnosis Date    Hyperlipidemia     Hypertension     Panic attack     Panic disorder      History reviewed. No pertinent surgical history.    No Known Allergies        Current Medications:   Current Outpatient Medications   Medication Sig Dispense Refill    B-D SYRINGE LUER-SHANTA 1CC 1 ML misc use TO INJECT testosterone      clonazePAM (KlonoPIN) 1 MG tablet 1 mg every morning for panic and may take 0.5 mg every evening as needed for panic 45 tablet 2    PARoxetine (PAXIL) 20 MG tablet Take 1 tablet by mouth Daily for 90 days. 90 tablet 0    propranolol (INDERAL) 10 MG tablet 10 mg twice daily and may take 10 mg every evening as needed. 90 tablet 1    Testosterone Cypionate (DEPOTESTOTERONE CYPIONATE) 200 MG/ML injection INJECT 0.2ml SUBCUTANEOUS TWICE A WEEK       No current facility-administered medications for this visit.         Review of Systems   Constitutional: Negative.    HENT: Negative.     Eyes: Negative.    Respiratory: Negative.  Negative for shortness of breath.    Cardiovascular: Negative.  Negative for chest pain and palpitations.   Gastrointestinal: Negative.  Negative for nausea.   Endocrine: Negative.    Genitourinary: Negative.    Musculoskeletal: Negative.    Skin: Negative.    Allergic/Immunologic: Negative.    Neurological: Negative.    Hematological: Negative.    Psychiatric/Behavioral:  Negative for confusion. The patient is not nervous/anxious.     denies HEENT, cardiovascular, respiratory, liver, renal, GI/, endocrine, neuro, DERM, hematology, immunology, musculoskeletal disorders.    Objective  "  Physical Exam  Vitals reviewed.   Constitutional:       Appearance: Normal appearance.   HENT:      Head: Normocephalic.      Nose: Nose normal.      Mouth/Throat:      Mouth: Mucous membranes are moist.      Pharynx: Oropharynx is clear.   Eyes:      Extraocular Movements: Extraocular movements intact.      Pupils: Pupils are equal, round, and reactive to light.   Cardiovascular:      Rate and Rhythm: Normal rate.   Pulmonary:      Effort: Pulmonary effort is normal.   Musculoskeletal:         General: Normal range of motion.      Cervical back: Normal range of motion.   Skin:     General: Skin is warm and dry.   Neurological:      General: No focal deficit present.      Mental Status: He is alert and oriented to person, place, and time.   Psychiatric:         Attention and Perception: Attention and perception normal. He is attentive.         Mood and Affect: Mood and affect normal. Mood is not anxious. Affect is not tearful.         Speech: Speech normal.         Behavior: Behavior normal. Behavior is cooperative.         Thought Content: Thought content normal.         Cognition and Memory: Cognition and memory normal.         Judgment: Judgment normal. Judgment is not impulsive.     Blood pressure 151/90, pulse 61, height 182.9 cm (72.01\"), weight 101 kg (221 lb 12.8 oz), SpO2 97%.    Mental Status Exam:   Hygiene:   good  Cooperation:  Cooperative  Eye Contact:  Good  Psychomotor Behavior:  Appropriate  Affect:   Appropriate   Hopelessness: Denies  Speech:  Normal  Thought Process:  Goal directed and Linear  Thought Content:  Normal  Suicidal:  None  Homicidal:  None  Hallucinations:  None  Delusion:  None  Memory:  Intact  Orientation:  Person, Place, Time, and Situation  Reliability:  fair  Insight:  Fair  Judgement:  Fair  Impulse Control:  Fair  Physical/Medical Issues:  No       Short-term goals: Patient will be compliant with clinic appointments.  Patient will be engaged in therapy, medication compliant " with minimal side effects. Patient  will report decrease of symptoms and frequency.    Long-term goals: Patient will have minimal symptoms of  with continued medication management. Patient will be compliant with treatment and appointments.      Strengths: motivation, insight, support   Weaknesses: coping skills    Prognosis: Guarded dependent on medication/follow up and treatment plan compliance.  Functional Status:severe impairment in areas of daily functioning.      Assessment & Plan   Diagnoses and all orders for this visit:    1. Panic disorder with agoraphobia        -Bridger reviewed and appropriate   -Continue Paxil to 20 mg PO daily for panic   -Continue clonazepam 1 mg PO qAM and 0.5 mg daily PRN for panic   -Continue propranolol 10 mg p.o. twice daily for anxiety and may take 1 tablet nightly PRN for panic   -Recommend psychotherapy   -Medication refills are not due at this time    Discussed medication and psychotherapy options.  Patient is to continue clonazepam and propranolol without change.  Increasing Paxil as above.  Discussed the risks, benefits, and side effects of the medication; client acknowledged and verbally consented.  Patient is aware to contact the [facility] with any worsening of symptom.  Patient is agreeable to go to the ER or call 911 should they begin SI/HI.      Return in about 3 months (around 4/20/2025), or if symptoms worsen or fail to improve, for Next scheduled follow up.        This document has been electronically signed by JUAN Mayfield   January 20, 2025 15:13 EST       Part of this note may be an electronic transcription/translation of spoken language to printed text using the Dragon Dictation System.

## 2025-01-20 NOTE — TELEPHONE ENCOUNTER
Spoke with patient he verbalized understanding    Visit Information Date & Time Provider Department Dept. Phone Encounter #  
 4/7/2017  1:00 PM Kirill Grimm MD [facility] [phone] [id number] Follow-up Instructions Return in about 1 month (around 5/7/2017) for blood pressure follow up, Medicare Wellness Visit same day, (30). Upcoming Health Maintenance Date Due  
 GLAUCOMA SCREENING Q2Y 6/30/2006 MEDICARE YEARLY EXAM 6/30/2006 HEMOGLOBIN A1C Q6M 6/30/2015 FOOT EXAM Q1 12/31/2015 MICROALBUMIN Q1 12/31/2015 EYE EXAM RETINAL OR DILATED Q1 12/31/2015 Pneumococcal 65+ High/Highest Risk (2 of 2 - PPSV23) 2/5/2016 LIPID PANEL Q1 5/9/2016 INFLUENZA AGE 9 TO ADULT 8/1/2016 DTaP/Tdap/Td series (2 - Td) 5/12/2023 Allergies as of 4/7/2017  Review Complete On: 4/7/2017 By: Kirill Grimm MD  
 No Known Allergies Current Immunizations  Reviewed on 5/26/2015 Name Date Influenza Vaccine Split 10/26/2011 Pneumococcal Conjugate (PCV-13) 12/11/2015 TD Vaccine 7/25/2010 Tdap 5/12/2013  5:07 PM  
  
 Not reviewed this visit You Were Diagnosed With   
  
 Codes Comments Essential hypertension    -  Primary ICD-10-CM: I10 
ICD-9-CM: 401.9 Vitals BP Pulse Temp Resp SpO2 Smoking Status 100/60 (BP 1 Location: Left arm, BP Patient Position: Sitting) (!) 119 97.6 °F (36.4 °C) (Temporal) 16 95% Former Smoker Preferred Pharmacy Pharmacy Name Phone [facility] [address] [place] [address] Rd [phone] Your Updated Medication List  
  
   
This list is accurate as of: 4/7/17  1:35 PM.  Always use your most recent med list.  
  
  
  
  
 acetaminophen 325 mg tablet Commonly known as:  TYLENOL Take 650 mg by mouth every six (6) hours as needed for Pain. * albuterol 2.5 mg /3 mL (0.083 %) nebulizer solution Commonly known as:  PROVENTIL VENTOLIN  
 3 mL by Nebulization route every four (4) hours as needed for Wheezing. * albuterol 90 mcg/actuation inhaler Commonly known as:  PROVENTIL HFA, VENTOLIN HFA, PROAIR HFA Take 2 Puffs by inhalation every four (4) hours as needed. atorvastatin 40 mg tablet Commonly known as:  LIPITOR  
take 1 tablet by mouth once daily  
  
 clopidogrel 75 mg Tab Commonly known as:  PLAVIX  
  
 COL-RITE 100 mg capsule Generic drug:  docusate sodium Take 100 mg by mouth nightly. fluticasone-salmeterol 250-50 mcg/dose diskus inhaler Commonly known as:  ADVAIR DISKUS Take 1 Puff by inhalation every twelve (12) hours. gabapentin 300 mg capsule Commonly known as:  NEURONTIN Take 2 Caps by mouth nightly. guaiFENesin  mg SR tablet Commonly known as:  Gaurav & Gaurav Take 600 mg by mouth two (2) times a day. inhalational spacing device Commonly known as:  AEROCHAMBER  
1 Each by Does Not Apply route as needed. ketoconazole 2 % topical cream  
Commonly known as:  NIZORAL Apply  to affected area two (2) times a day. Indications: FUNGAL INFECTION OF SKIN  
  
 nebivolol 5 mg tablet Commonly known as:  BYSTOLIC Take 1 Tab by mouth daily. Nebulizer & Compressor machine 1 Each by Does Not Apply route as directed. NexIUM 20 mg capsule Generic drug:  esomeprazole Take  by mouth daily. nitroglycerin 0.4 mg SL tablet Commonly known as:  NITROSTAT  
1 Tab by SubLINGual route every five (5) minutes as needed. Up to 3 doses. Then call 911 if continues to have pain. primidone 50 mg tablet Commonly known as: MYSOLINE Take 1.5 Tabs by mouth two (2) times a day. sertraline 50 mg tablet Commonly known as:  ZOLOFT Take 1 Tab by mouth daily. tamsulosin 0.4 mg capsule Commonly known as:  FLOMAX  
take 2 capsules by mouth once daily  
  
 tiotropium 18 mcg inhalation capsule Commonly known as:  [address] [place] [address]  
 Take 1 Cap by inhalation daily. * Notice: This list has 2 medication(s) that are the same as other medications prescribed for you. Read the directions carefully, and ask your doctor or other care provider to review them with you. Prescriptions Sent to Pharmacy Refills  
 nebivolol (BYSTOLIC) 5 mg tablet 1 Sig: Take 1 Tab by mouth daily. Class: Normal  
 Pharmacy: [address] [place] [address], [address] [place] [address] #: [phone] Route: Oral  
  
Follow-up Instructions Return in about 1 month (around 5/7/2017) for blood pressure follow up, Medicare Wellness Visit same day, (30). Patient Instructions Changes today are assuming we are correct that he's only taking Bystolic 10 mg once daily. Schedule visit with Dr. Andrae Li for tremors Bring all bottles to every visit. Introducing \[facility] [place] [facility]\"" & [facility]! Dear Susu Gusman: 
Thank you for requesting a [employer] account. Our records indicate that you have previously registered for a [employer] account but its currently inactive. Please call our [employer] support line at [phone]. Additional Information If you have questions, please visit the Frequently Asked Questions section of the [employer] website at [email or web address] Remember, [employer] is NOT to be used for urgent needs. For medical emergencies, dial 911. Now available from your iPhone and Android! Please provide this summary of care documentation to your next provider. Your primary care clinician is listed as Kvng Parra. If you have any questions after today's visit, please call [phone].

## 2025-03-05 DIAGNOSIS — F40.01 PANIC DISORDER WITH AGORAPHOBIA: ICD-10-CM

## 2025-03-06 RX ORDER — PAROXETINE 30 MG/1
30 TABLET, FILM COATED ORAL DAILY
Qty: 90 TABLET | Refills: 0 | OUTPATIENT
Start: 2025-03-06

## 2025-03-20 ENCOUNTER — OFFICE VISIT (OUTPATIENT)
Dept: PSYCHIATRY | Facility: CLINIC | Age: 50
End: 2025-03-20
Payer: MEDICARE

## 2025-03-20 VITALS
BODY MASS INDEX: 29.69 KG/M2 | OXYGEN SATURATION: 98 % | WEIGHT: 219.2 LBS | SYSTOLIC BLOOD PRESSURE: 135 MMHG | DIASTOLIC BLOOD PRESSURE: 82 MMHG | HEIGHT: 72 IN | HEART RATE: 79 BPM

## 2025-03-20 DIAGNOSIS — F40.01 PANIC DISORDER WITH AGORAPHOBIA: ICD-10-CM

## 2025-03-20 PROCEDURE — 3075F SYST BP GE 130 - 139MM HG: CPT

## 2025-03-20 PROCEDURE — 99213 OFFICE O/P EST LOW 20 MIN: CPT

## 2025-03-20 PROCEDURE — 1159F MED LIST DOCD IN RCRD: CPT

## 2025-03-20 PROCEDURE — 3079F DIAST BP 80-89 MM HG: CPT

## 2025-03-20 PROCEDURE — 1160F RVW MEDS BY RX/DR IN RCRD: CPT

## 2025-03-20 RX ORDER — CLONAZEPAM 1 MG/1
TABLET ORAL
Qty: 45 TABLET | Refills: 2 | Status: SHIPPED | OUTPATIENT
Start: 2025-03-20

## 2025-03-20 RX ORDER — PAROXETINE 20 MG/1
20 TABLET, FILM COATED ORAL DAILY
Qty: 90 TABLET | Refills: 0 | Status: SHIPPED | OUTPATIENT
Start: 2025-03-20 | End: 2025-06-18

## 2025-03-20 NOTE — PROGRESS NOTES
Subjective   Alejo Wheatley is a 50 y.o. male who is here today for medication management follow up encounter.     Chief Complaint: Anxiety and panic    History of Present Illness  He reports a general improvement in his condition, although he continues to experience residual anxiety on occasion typically after stressors. He describes feelings of nervousness and anxiety, with a recent episode of a panic attack. This episode was characterized by an onset of anxiety while driving, accompanied by chest tightness and cold sensations in his arm. He sought solace in a bathroom, where he engaged in a game and conversed with his wife over the speaker, which seemed to alleviate his symptoms. However, he notes that these episodes leave him feeling weak and stressed for 2 to 3 days afterward in fear of it happening again. He also mentions a subsequent depressive state, questioning his progress. His wife provides support and encouragement, reminding him that a single bad day does not define his overall condition. He recalls a visit to his mother's grave earlier that day, which may have contributed to his heightened emotional state.  He reports that prior to medication he would have not been able to continue his trip to the store and would have went back home.  He feels that overall anxiety has been manageable.  He has been working on different interventions at home to distract himself.  He has started playing music in the garage which has been very helpful in distracting him when he is by his self.  He denies any depression.  Sleep has been appropriate.  Appetite is doing well. Body mass index is 29.72 kg/m². Denies self-harm.  Denies AVH.  Denies SI and HI.      Family Psychiatric History:  family history includes Anxiety disorder in his father and paternal aunt.    Medical/Surgical History:  Past Medical History:   Diagnosis Date    Hyperlipidemia     Hypertension     Panic attack     Panic disorder      History reviewed. No  pertinent surgical history.    No Known Allergies        Current Medications:   Current Outpatient Medications   Medication Sig Dispense Refill    clonazePAM (KlonoPIN) 1 MG tablet 1 mg every morning for panic and may take 0.5 mg every evening as needed for panic 45 tablet 2    PARoxetine (PAXIL) 20 MG tablet Take 1 tablet by mouth Daily for 90 days. 90 tablet 0    B-D SYRINGE LUER-SHANTA 1CC 1 ML misc use TO INJECT testosterone      propranolol (INDERAL) 10 MG tablet 10 mg twice daily and may take 10 mg every evening as needed. 90 tablet 1    Testosterone Cypionate (DEPOTESTOTERONE CYPIONATE) 200 MG/ML injection INJECT 0.2ml SUBCUTANEOUS TWICE A WEEK       No current facility-administered medications for this visit.         Review of Systems   Constitutional: Negative.    HENT: Negative.     Eyes: Negative.    Respiratory: Negative.  Negative for shortness of breath.    Cardiovascular: Negative.  Negative for chest pain and palpitations.   Gastrointestinal: Negative.  Negative for nausea.   Endocrine: Negative.    Genitourinary: Negative.    Musculoskeletal: Negative.    Skin: Negative.    Allergic/Immunologic: Negative.    Neurological: Negative.    Hematological: Negative.    Psychiatric/Behavioral:  Negative for confusion. The patient is not nervous/anxious.     denies HEENT, cardiovascular, respiratory, liver, renal, GI/, endocrine, neuro, DERM, hematology, immunology, musculoskeletal disorders.    Objective   Physical Exam  Vitals reviewed.   Constitutional:       Appearance: Normal appearance.   HENT:      Head: Normocephalic.      Nose: Nose normal.      Mouth/Throat:      Mouth: Mucous membranes are moist.      Pharynx: Oropharynx is clear.   Eyes:      Extraocular Movements: Extraocular movements intact.      Pupils: Pupils are equal, round, and reactive to light.   Cardiovascular:      Rate and Rhythm: Normal rate.   Pulmonary:      Effort: Pulmonary effort is normal.   Musculoskeletal:         General:  "Normal range of motion.      Cervical back: Normal range of motion.   Skin:     General: Skin is warm and dry.   Neurological:      General: No focal deficit present.      Mental Status: He is alert and oriented to person, place, and time.   Psychiatric:         Attention and Perception: Attention and perception normal. He is attentive.         Mood and Affect: Mood and affect normal. Mood is not anxious. Affect is not tearful.         Speech: Speech normal.         Behavior: Behavior normal. Behavior is cooperative.         Thought Content: Thought content normal.         Cognition and Memory: Cognition and memory normal.         Judgment: Judgment normal. Judgment is not impulsive.     Blood pressure 135/82, pulse 79, height 182.9 cm (72.01\"), weight 99.4 kg (219 lb 3.2 oz), SpO2 98%.    Mental Status Exam:   Hygiene:   good  Cooperation:  Cooperative  Eye Contact:  Good  Psychomotor Behavior:  Appropriate  Affect:   Appropriate   Hopelessness: Denies  Speech:  Normal  Thought Process:  Goal directed and Linear  Thought Content:  Normal  Suicidal:  None  Homicidal:  None  Hallucinations:  None  Delusion:  None  Memory:  Intact  Orientation:  Person, Place, Time, and Situation  Reliability:  fair  Insight:  Fair  Judgement:  Fair  Impulse Control:  Fair  Physical/Medical Issues:  No       Short-term goals: Patient will be compliant with clinic appointments.  Patient will be engaged in therapy, medication compliant with minimal side effects. Patient  will report decrease of symptoms and frequency.    Long-term goals: Patient will have minimal symptoms of  with continued medication management. Patient will be compliant with treatment and appointments.      Strengths: motivation, insight, support   Weaknesses: coping skills    Prognosis: Guarded dependent on medication/follow up and treatment plan compliance.  Functional Status:severe impairment in areas of daily functioning.      Assessment & Plan   Diagnoses and all " orders for this visit:    1. Panic disorder with agoraphobia  -     clonazePAM (KlonoPIN) 1 MG tablet; 1 mg every morning for panic and may take 0.5 mg every evening as needed for panic  Dispense: 45 tablet; Refill: 2  -     PARoxetine (PAXIL) 20 MG tablet; Take 1 tablet by mouth Daily for 90 days.  Dispense: 90 tablet; Refill: 0          -Bridger reviewed and appropriate   -Continue Paxil 20 mg PO daily for panic   -Continue clonazepam 1 mg PO qAM and 0.5 mg daily PRN for panic   -Continue propranolol 10 mg p.o. twice daily for anxiety and may take 1 tablet nightly PRN for panic   -Recommend psychotherapy   -Medication refills are not due at this time    Discussed medication and psychotherapy options.  Patient is doing well on current medication regimen and is pleased with progress.  He is to continue the above regimen without change.  Discussed the risks, benefits, and side effects of the medication; client acknowledged and verbally consented.  Patient is aware to contact the [facility] with any worsening of symptom.  Patient is agreeable to go to the ER or call 911 should they begin SI/HI.      Return in about 3 months (around 6/20/2025), or if symptoms worsen or fail to improve, for Next scheduled follow up.          This document has been electronically signed by JUAN Mayfield   March 20, 2025 16:59 EDT     Please note that portions of this note were completed with a voice recognition program  Patient or patient representative verbalized consent for the use of Ambient Listening during the visit with  JUAN Mayfield for chart documentation. 3/20/2025  16:58 EDT

## 2025-06-18 ENCOUNTER — TELEPHONE (OUTPATIENT)
Dept: FAMILY MEDICINE CLINIC | Facility: CLINIC | Age: 50
End: 2025-06-18
Payer: MEDICARE

## 2025-06-18 DIAGNOSIS — F40.01 PANIC DISORDER WITH AGORAPHOBIA: ICD-10-CM

## 2025-06-18 RX ORDER — PAROXETINE 20 MG/1
20 TABLET, FILM COATED ORAL DAILY
Qty: 90 TABLET | Refills: 0 | Status: SHIPPED | OUTPATIENT
Start: 2025-06-18 | End: 2025-09-16

## 2025-06-18 RX ORDER — CLONAZEPAM 1 MG/1
TABLET ORAL
Qty: 45 TABLET | Refills: 2 | Status: SHIPPED | OUTPATIENT
Start: 2025-06-18

## 2025-06-18 RX ORDER — PROPRANOLOL HYDROCHLORIDE 10 MG/1
TABLET ORAL
Qty: 90 TABLET | Refills: 1 | Status: SHIPPED | OUTPATIENT
Start: 2025-06-18

## 2025-06-18 NOTE — TELEPHONE ENCOUNTER
Pt had a card with an appt for today on it but was not put on the schedule he needs refills and I cannot get him in until mid July will he be able to get refill till then

## 2025-07-16 ENCOUNTER — OFFICE VISIT (OUTPATIENT)
Dept: PSYCHIATRY | Facility: CLINIC | Age: 50
End: 2025-07-16
Payer: MEDICARE

## 2025-07-16 VITALS
OXYGEN SATURATION: 97 % | HEIGHT: 72 IN | BODY MASS INDEX: 29.99 KG/M2 | HEART RATE: 69 BPM | SYSTOLIC BLOOD PRESSURE: 123 MMHG | DIASTOLIC BLOOD PRESSURE: 79 MMHG | WEIGHT: 221.4 LBS

## 2025-07-16 DIAGNOSIS — K21.9 GASTROESOPHAGEAL REFLUX DISEASE, UNSPECIFIED WHETHER ESOPHAGITIS PRESENT: ICD-10-CM

## 2025-07-16 DIAGNOSIS — F40.01 PANIC DISORDER WITH AGORAPHOBIA: Primary | ICD-10-CM

## 2025-07-16 PROCEDURE — 1159F MED LIST DOCD IN RCRD: CPT

## 2025-07-16 PROCEDURE — 3078F DIAST BP <80 MM HG: CPT

## 2025-07-16 PROCEDURE — 1160F RVW MEDS BY RX/DR IN RCRD: CPT

## 2025-07-16 PROCEDURE — 3074F SYST BP LT 130 MM HG: CPT

## 2025-07-16 PROCEDURE — 99214 OFFICE O/P EST MOD 30 MIN: CPT

## 2025-07-16 RX ORDER — PANTOPRAZOLE SODIUM 40 MG/1
40 TABLET, DELAYED RELEASE ORAL DAILY
Qty: 90 TABLET | OUTPATIENT
Start: 2025-07-16

## 2025-07-16 RX ORDER — EPINEPHRINE 0.3 MG/.3ML
INJECTION SUBCUTANEOUS
COMMUNITY
Start: 2025-07-15

## 2025-07-16 RX ORDER — ERGOCALCIFEROL 1.25 MG/1
1 CAPSULE ORAL
COMMUNITY

## 2025-07-16 RX ORDER — PANTOPRAZOLE SODIUM 40 MG/1
40 TABLET, DELAYED RELEASE ORAL DAILY
Qty: 60 TABLET | Refills: 0 | Status: SHIPPED | OUTPATIENT
Start: 2025-07-16 | End: 2025-09-14

## 2025-07-16 RX ORDER — SIMVASTATIN 40 MG
TABLET ORAL EVERY 24 HOURS
COMMUNITY

## 2025-07-16 NOTE — PROGRESS NOTES
"Subjective   Alejo Wheatley is a 50 y.o. male who is here today for medication management follow up encounter.  Presents with his spouse whom he gives permission to speak in front of.    Chief Complaint: Anxiety and panic    History of Present Illness  He denies any side effects associated with current medication regimen.  He reports that anxiety has been much more manageable.  Reports that he is getting out of the home by himself.  Occasional panic which he deems as \"mild.\"  He reports that he is able to utilize distraction to improve symptomology.  His wife identifies that she has been able to tell positive differences as well.  No concerns for depression at this time.  Sleep is doing well.  Denies any appetite variation. Body mass index is 30.02 kg/m².  Inquires on medication for acid reflux.  Reports that since beginning Paxil he has been experiencing acid reflux symptoms. Failed omeprazole. Using tums daily. Denies AVH, SI, HI, SH.     Family Psychiatric History:  family history includes Anxiety disorder in his father and paternal aunt.    Medical/Surgical History:  .    History reviewed. No pertinent surgical history.    Allergies   Allergen Reactions    Aspirin Unknown - High Severity           Current Medications:   Current Outpatient Medications   Medication Sig Dispense Refill    clonazePAM (KlonoPIN) 1 MG tablet 1 mg every morning for panic and may take 0.5 mg every evening as needed for panic 45 tablet 2    EPINEPHrine (EPIPEN) 0.3 MG/0.3ML solution auto-injector injection       PARoxetine (PAXIL) 20 MG tablet Take 1 tablet by mouth Daily for 90 days. 90 tablet 0    B-D SYRINGE LUER-SHANTA 1CC 1 ML misc use TO INJECT testosterone      pantoprazole (Protonix) 40 MG EC tablet Take 1 tablet by mouth Daily for 60 days. 60 tablet 0    simvastatin (ZOCOR) 40 MG tablet Daily.      Testosterone Cypionate (DEPOTESTOTERONE CYPIONATE) 200 MG/ML injection INJECT 0.2ml SUBCUTANEOUS TWICE A WEEK      Vitamin D, " Ergocalciferol, 85448 units capsule 1 capsule.       No current facility-administered medications for this visit.         Review of Systems   Constitutional: Negative.    HENT: Negative.     Eyes: Negative.    Respiratory: Negative.  Negative for shortness of breath.    Cardiovascular: Negative.  Negative for chest pain and palpitations.   Gastrointestinal: Negative.  Negative for nausea.   Endocrine: Negative.    Genitourinary: Negative.    Musculoskeletal: Negative.    Skin: Negative.    Allergic/Immunologic: Negative.    Neurological: Negative.    Hematological: Negative.    Psychiatric/Behavioral:  Negative for confusion. The patient is not nervous/anxious.     denies HEENT, cardiovascular, respiratory, liver, renal, GI/, endocrine, neuro, DERM, hematology, immunology, musculoskeletal disorders.    Objective   Physical Exam  Vitals reviewed.   Constitutional:       Appearance: Normal appearance.   HENT:      Head: Normocephalic.      Nose: Nose normal.      Mouth/Throat:      Mouth: Mucous membranes are moist.      Pharynx: Oropharynx is clear.   Eyes:      Extraocular Movements: Extraocular movements intact.      Pupils: Pupils are equal, round, and reactive to light.   Cardiovascular:      Rate and Rhythm: Normal rate.   Pulmonary:      Effort: Pulmonary effort is normal.   Musculoskeletal:         General: Normal range of motion.      Cervical back: Normal range of motion.   Skin:     General: Skin is warm and dry.   Neurological:      General: No focal deficit present.      Mental Status: He is alert and oriented to person, place, and time.   Psychiatric:         Attention and Perception: Attention and perception normal. He is attentive.         Mood and Affect: Mood and affect normal. Mood is not anxious. Affect is not tearful.         Speech: Speech normal.         Behavior: Behavior normal. Behavior is cooperative.         Thought Content: Thought content normal.         Cognition and Memory: Cognition  "and memory normal.         Judgment: Judgment normal. Judgment is not impulsive.     Blood pressure 123/79, pulse 69, height 182.9 cm (72.01\"), weight 100 kg (221 lb 6.4 oz), SpO2 97%.    Mental Status Exam:   Hygiene:   good  Cooperation:  Cooperative  Eye Contact:  Good  Psychomotor Behavior:  Appropriate  Affect:  Appropriate   Hopelessness: Denies  Speech:  Normal  Thought Process:  Goal directed and Linear  Thought Content:  Normal  Suicidal:  None  Homicidal:  None  Hallucinations:  None  Delusion:  None  Memory:  Intact  Orientation:  Person, Place, Time, and Situation  Reliability:  fair  Insight:  Fair  Judgement:  Fair  Impulse Control:  Fair  Physical/Medical Issues:  No       Short-term goals: Patient will be compliant with clinic appointments.  Patient will be engaged in therapy, medication compliant with minimal side effects. Patient  will report decrease of symptoms and frequency.    Long-term goals: Patient will have minimal symptoms of  with continued medication management. Patient will be compliant with treatment and appointments.      Strengths: motivation, insight, support   Weaknesses: coping skills    Prognosis: Guarded dependent on medication/follow up and treatment plan compliance.  Functional Status:severe impairment in areas of daily functioning.      Assessment & Plan   Diagnoses and all orders for this visit:    1. Panic disorder with agoraphobia (Primary)    2. Gastroesophageal reflux disease related to medication , unspecified whether esophagitis present  -     pantoprazole (Protonix) 40 MG EC tablet; Take 1 tablet by mouth Daily for 60 days.  Dispense: 60 tablet; Refill: 0        -Bridger reviewed and appropriate   -Continue Paxil 20 mg PO daily for panic   -Continue clonazepam 1 mg PO qAM and 0.5 mg daily PRN for panic   -Discontinue propanolol, no longer taking   -Start protonix 40 mg PO daily for acid reflux   -Recommend psychotherapy   -Medication submitted to pharmacy at this time "     Discussed medication and psychotherapy options.  Overall anxiety has been manageable with the above medication regimen.  He is to continue Paxil and clonazepam as needed without change.  No longer taking propranolol, therefore discontinuing.  Starting Protonix for acid reflux.  Discussed the risks, benefits, and side effects of the medication; client acknowledged and verbally consented.  Patient is aware to contact the [facility] with any worsening of symptom.  Patient is agreeable to go to the ER or call 911 should they begin SI/HI.    Lifestyle and Dietary Modifications for Acid Reflux:    Dietary Adjustments:  -Avoid trigger foods such as caffeine, chocolate, spicy foods, fatty/fried foods, citrus, tomatoes, peppermint, and carbonated beverages.  -Eat smaller, more frequent meals to reduce gastric pressure.  -Avoid eating within 2-3 hours of bedtime.    Lifestyle Changes:  -Elevate the head of the bed 6-8 inches to prevent nighttime reflux.  -Maintain a healthy weight; weight loss is recommended if overweight.  -Avoid tight-fitting clothing that increases abdominal pressure.  -Limit alcohol intake and tobacco use.    Behavioral Modifications:  -Chew food thoroughly and eat slowly.  -Remain upright for at least 30-60 minutes after meals.  -Monitor and manage stress levels, which can worsen symptoms.      Return in about 3 months (around 10/16/2025), or if symptoms worsen or fail to improve, for Next scheduled follow up.          This document has been electronically signed by JUAN Mayfield   July 16, 2025 16:32 EDT     Please note that portions of this note were completed with a voice recognition program  Patient or patient representative verbalized consent for the use of Ambient Listening during the visit with  JUAN Mayfield for chart documentation. 7/16/2025  16:58 EDT